# Patient Record
Sex: FEMALE | Race: WHITE | NOT HISPANIC OR LATINO
[De-identification: names, ages, dates, MRNs, and addresses within clinical notes are randomized per-mention and may not be internally consistent; named-entity substitution may affect disease eponyms.]

---

## 2017-01-26 ENCOUNTER — APPOINTMENT (OUTPATIENT)
Dept: OBGYN | Facility: CLINIC | Age: 52
End: 2017-01-26

## 2017-01-26 VITALS
BODY MASS INDEX: 29.82 KG/M2 | WEIGHT: 190 LBS | HEIGHT: 67 IN | DIASTOLIC BLOOD PRESSURE: 76 MMHG | SYSTOLIC BLOOD PRESSURE: 138 MMHG

## 2017-01-26 DIAGNOSIS — B00.2 HERPESVIRAL GINGIVOSTOMATITIS AND PHARYNGOTONSILLITIS: ICD-10-CM

## 2017-01-26 DIAGNOSIS — Z80.3 FAMILY HISTORY OF MALIGNANT NEOPLASM OF BREAST: ICD-10-CM

## 2017-01-26 DIAGNOSIS — Z87.2 PERSONAL HISTORY OF DISEASES OF THE SKIN AND SUBCUTANEOUS TISSUE: ICD-10-CM

## 2017-01-26 LAB
DATE COLLECTED: NORMAL
HEMOCCULT SP1 STL QL: NEGATIVE
QUALITY CONTROL: YES

## 2017-01-26 RX ORDER — VORTIOXETINE 20 MG/1
20 TABLET, FILM COATED ORAL
Qty: 90 | Refills: 0 | Status: DISCONTINUED | COMMUNITY
Start: 2016-08-09 | End: 2017-01-26

## 2017-02-01 ENCOUNTER — RX RENEWAL (OUTPATIENT)
Age: 52
End: 2017-02-01

## 2017-02-01 LAB
CYTOLOGY CVX/VAG DOC THIN PREP: NORMAL
HPV HIGH+LOW RISK DNA PNL CVX: NEGATIVE

## 2017-04-19 ENCOUNTER — RX RENEWAL (OUTPATIENT)
Age: 52
End: 2017-04-19

## 2017-04-19 DIAGNOSIS — R39.9 UNSPECIFIED SYMPTOMS AND SIGNS INVOLVING THE GENITOURINARY SYSTEM: ICD-10-CM

## 2018-02-12 ENCOUNTER — APPOINTMENT (OUTPATIENT)
Dept: OBGYN | Facility: CLINIC | Age: 53
End: 2018-02-12
Payer: COMMERCIAL

## 2018-02-12 VITALS
HEIGHT: 67 IN | BODY MASS INDEX: 29.82 KG/M2 | SYSTOLIC BLOOD PRESSURE: 130 MMHG | WEIGHT: 190 LBS | DIASTOLIC BLOOD PRESSURE: 80 MMHG

## 2018-02-12 DIAGNOSIS — Z86.59 PERSONAL HISTORY OF OTHER MENTAL AND BEHAVIORAL DISORDERS: ICD-10-CM

## 2018-02-12 LAB
DATE COLLECTED: NORMAL
HEMOCCULT SP1 STL QL: NEGATIVE
QUALITY CONTROL: YES

## 2018-02-12 PROCEDURE — 99396 PREV VISIT EST AGE 40-64: CPT

## 2018-02-12 PROCEDURE — 36415 COLL VENOUS BLD VENIPUNCTURE: CPT

## 2018-02-12 PROCEDURE — 99214 OFFICE O/P EST MOD 30 MIN: CPT | Mod: 25

## 2018-02-12 PROCEDURE — 82270 OCCULT BLOOD FECES: CPT

## 2018-02-12 RX ORDER — ACYCLOVIR 800 MG/1
800 TABLET ORAL
Qty: 90 | Refills: 3 | Status: COMPLETED | COMMUNITY
Start: 2017-01-26 | End: 2018-02-12

## 2018-02-12 RX ORDER — ESCITALOPRAM OXALATE 10 MG/1
10 TABLET ORAL
Qty: 30 | Refills: 0 | Status: DISCONTINUED | COMMUNITY
Start: 2017-01-19 | End: 2018-02-12

## 2018-02-16 LAB
CYTOLOGY CVX/VAG DOC THIN PREP: NORMAL
HPV HIGH+LOW RISK DNA PNL CVX: NOT DETECTED

## 2018-03-10 DIAGNOSIS — Z13.71 ENCOUNTER FOR NONPROCREATIVE SCREENING FOR GENETIC DISEASE CARRIER STATUS: ICD-10-CM

## 2018-08-30 ENCOUNTER — APPOINTMENT (OUTPATIENT)
Dept: RHEUMATOLOGY | Facility: CLINIC | Age: 53
End: 2018-08-30
Payer: COMMERCIAL

## 2018-08-30 VITALS
OXYGEN SATURATION: 97 % | HEIGHT: 67 IN | SYSTOLIC BLOOD PRESSURE: 138 MMHG | TEMPERATURE: 98.9 F | DIASTOLIC BLOOD PRESSURE: 82 MMHG | HEART RATE: 73 BPM | BODY MASS INDEX: 30.92 KG/M2 | WEIGHT: 197 LBS

## 2018-08-30 DIAGNOSIS — Z83.79 FAMILY HISTORY OF OTHER DISEASES OF THE DIGESTIVE SYSTEM: ICD-10-CM

## 2018-08-30 PROCEDURE — 99205 OFFICE O/P NEW HI 60 MIN: CPT | Mod: 25

## 2018-08-30 PROCEDURE — 36415 COLL VENOUS BLD VENIPUNCTURE: CPT

## 2018-09-16 PROBLEM — Z83.79 FAMILY HISTORY OF CROHN'S DISEASE: Status: ACTIVE | Noted: 2018-09-16

## 2018-09-16 RX ORDER — ESCITALOPRAM OXALATE 20 MG/1
20 TABLET ORAL
Refills: 0 | Status: COMPLETED | COMMUNITY

## 2018-09-16 RX ORDER — ALPRAZOLAM 0.25 MG/1
0.25 TABLET ORAL
Refills: 0 | Status: COMPLETED | COMMUNITY

## 2018-09-16 RX ORDER — FOLIC ACID 1 MG/1
1 TABLET ORAL
Refills: 0 | Status: COMPLETED | COMMUNITY

## 2018-09-16 RX ORDER — PREDNISONE 10 MG/1
10 TABLET ORAL
Refills: 0 | Status: COMPLETED | COMMUNITY

## 2018-09-16 RX ORDER — LORATADINE 5 MG/5 ML
10 SOLUTION, ORAL ORAL
Refills: 0 | Status: COMPLETED | COMMUNITY

## 2018-09-16 RX ORDER — ACETAMINOPHEN 500 MG
1000 TABLET ORAL
Refills: 0 | Status: COMPLETED | COMMUNITY

## 2018-09-16 RX ORDER — DOCOSAHEXAENOIC ACID 300 MG
50 MCG CAPSULE ORAL
Refills: 0 | Status: COMPLETED | COMMUNITY

## 2018-09-25 ENCOUNTER — APPOINTMENT (OUTPATIENT)
Dept: RHEUMATOLOGY | Facility: CLINIC | Age: 53
End: 2018-09-25
Payer: COMMERCIAL

## 2018-09-25 VITALS
OXYGEN SATURATION: 98 % | SYSTOLIC BLOOD PRESSURE: 130 MMHG | TEMPERATURE: 98.5 F | HEIGHT: 67 IN | BODY MASS INDEX: 30.61 KG/M2 | DIASTOLIC BLOOD PRESSURE: 84 MMHG | WEIGHT: 195 LBS | HEART RATE: 69 BPM

## 2018-09-25 PROCEDURE — 99215 OFFICE O/P EST HI 40 MIN: CPT

## 2018-09-25 RX ORDER — PREDNISONE 2.5 MG/1
2.5 TABLET ORAL DAILY
Refills: 0 | Status: DISCONTINUED | COMMUNITY
End: 2018-09-25

## 2018-10-23 ENCOUNTER — APPOINTMENT (OUTPATIENT)
Dept: RHEUMATOLOGY | Facility: CLINIC | Age: 53
End: 2018-10-23
Payer: COMMERCIAL

## 2018-10-23 VITALS
TEMPERATURE: 98.4 F | BODY MASS INDEX: 30.45 KG/M2 | HEIGHT: 67 IN | OXYGEN SATURATION: 98 % | WEIGHT: 194 LBS | SYSTOLIC BLOOD PRESSURE: 144 MMHG | DIASTOLIC BLOOD PRESSURE: 76 MMHG | HEART RATE: 76 BPM

## 2018-10-23 PROCEDURE — 90686 IIV4 VACC NO PRSV 0.5 ML IM: CPT | Mod: LT

## 2018-10-23 PROCEDURE — G0008: CPT | Mod: LT

## 2018-10-23 PROCEDURE — 96401 CHEMO ANTI-NEOPL SQ/IM: CPT | Mod: RT

## 2018-10-23 PROCEDURE — 36415 COLL VENOUS BLD VENIPUNCTURE: CPT

## 2018-10-23 PROCEDURE — 99215 OFFICE O/P EST HI 40 MIN: CPT | Mod: 25

## 2018-10-24 ENCOUNTER — MED ADMIN CHARGE (OUTPATIENT)
Age: 53
End: 2018-10-24

## 2018-10-24 RX ADMIN — USTEKINUMAB 0 MG/0.5ML: 45 INJECTION, SOLUTION SUBCUTANEOUS at 00:00

## 2018-11-06 RX ORDER — ACYCLOVIR 800 MG/1
800 TABLET ORAL
Qty: 90 | Refills: 3 | Status: DISCONTINUED | COMMUNITY
Start: 2018-02-12 | End: 2018-11-06

## 2018-11-06 RX ORDER — LAMOTRIGINE 50 MG/1
50 TABLET, ORALLY DISINTEGRATING ORAL
Refills: 0 | Status: DISCONTINUED | COMMUNITY
End: 2018-11-06

## 2018-11-06 RX ORDER — ADALIMUMAB 40MG/0.8ML
40 KIT SUBCUTANEOUS
Qty: 3 | Refills: 3 | Status: DISCONTINUED | COMMUNITY
End: 2018-10-23

## 2018-11-12 RX ORDER — USTEKINUMAB 45 MG/.5ML
45 INJECTION, SOLUTION SUBCUTANEOUS
Qty: 0 | Refills: 0 | Status: COMPLETED | OUTPATIENT
Start: 2018-10-24

## 2018-11-27 ENCOUNTER — APPOINTMENT (OUTPATIENT)
Dept: RHEUMATOLOGY | Facility: CLINIC | Age: 53
End: 2018-11-27
Payer: COMMERCIAL

## 2018-11-27 ENCOUNTER — LABORATORY RESULT (OUTPATIENT)
Age: 53
End: 2018-11-27

## 2018-11-27 VITALS
OXYGEN SATURATION: 98 % | DIASTOLIC BLOOD PRESSURE: 82 MMHG | WEIGHT: 192 LBS | HEIGHT: 67 IN | TEMPERATURE: 97.4 F | BODY MASS INDEX: 30.13 KG/M2 | SYSTOLIC BLOOD PRESSURE: 123 MMHG | HEART RATE: 74 BPM

## 2018-11-27 DIAGNOSIS — M15.9 POLYOSTEOARTHRITIS, UNSPECIFIED: ICD-10-CM

## 2018-11-27 PROCEDURE — 96372 THER/PROPH/DIAG INJ SC/IM: CPT | Mod: RT

## 2018-11-27 PROCEDURE — 36415 COLL VENOUS BLD VENIPUNCTURE: CPT

## 2018-11-27 PROCEDURE — 99215 OFFICE O/P EST HI 40 MIN: CPT | Mod: 25

## 2018-11-27 RX ORDER — LAMOTRIGINE 2 MG/1
TABLET, FOR SUSPENSION ORAL
Refills: 0 | Status: DISCONTINUED | COMMUNITY
End: 2018-11-27

## 2018-11-27 RX ORDER — USTEKINUMAB 45 MG/.5ML
45 INJECTION, SOLUTION SUBCUTANEOUS
Qty: 2 | Refills: 0 | Status: DISCONTINUED | COMMUNITY
Start: 2018-10-23 | End: 2018-11-27

## 2018-11-27 RX ORDER — METHYLPRED ACET/NACL,ISO-OS/PF 40 MG/ML
40 VIAL (ML) INJECTION
Qty: 1 | Refills: 0 | Status: COMPLETED | OUTPATIENT
Start: 2018-11-27

## 2018-11-27 RX ADMIN — METHYLPREDNISOLONE ACETATE MG/ML: 40 INJECTION, SUSPENSION INTRA-ARTICULAR; INTRALESIONAL; INTRAMUSCULAR; SOFT TISSUE at 00:00

## 2018-12-27 ENCOUNTER — RX RENEWAL (OUTPATIENT)
Age: 53
End: 2018-12-27

## 2018-12-27 ENCOUNTER — APPOINTMENT (OUTPATIENT)
Dept: RHEUMATOLOGY | Facility: CLINIC | Age: 53
End: 2018-12-27
Payer: COMMERCIAL

## 2018-12-27 VITALS
SYSTOLIC BLOOD PRESSURE: 133 MMHG | HEART RATE: 77 BPM | TEMPERATURE: 98.6 F | WEIGHT: 187 LBS | DIASTOLIC BLOOD PRESSURE: 80 MMHG | HEIGHT: 67 IN | BODY MASS INDEX: 29.35 KG/M2 | OXYGEN SATURATION: 99 %

## 2018-12-27 PROCEDURE — 99215 OFFICE O/P EST HI 40 MIN: CPT | Mod: 25

## 2018-12-27 PROCEDURE — 36415 COLL VENOUS BLD VENIPUNCTURE: CPT

## 2018-12-27 RX ORDER — ONDANSETRON 4 MG/1
4 TABLET, ORALLY DISINTEGRATING ORAL
Qty: 60 | Refills: 0 | Status: DISCONTINUED | COMMUNITY
Start: 2018-11-06 | End: 2018-12-27

## 2019-01-24 LAB
25(OH)D3 SERPL-MCNC: 37.4 NG/ML
ALBUMIN SERPL ELPH-MCNC: 4.2 G/DL
ALBUMIN SERPL ELPH-MCNC: 4.3 G/DL
ALBUMIN SERPL ELPH-MCNC: 4.5 G/DL
ALP BLD-CCNC: 65 U/L
ALP BLD-CCNC: 83 U/L
ALP BLD-CCNC: 85 U/L
ALT SERPL-CCNC: 16 U/L
ALT SERPL-CCNC: 18 U/L
ALT SERPL-CCNC: 21 U/L
ANA PAT FLD IF-IMP: ABNORMAL
ANA SER IF-ACNC: ABNORMAL
ANION GAP SERPL CALC-SCNC: 11 MMOL/L
ANION GAP SERPL CALC-SCNC: 12 MMOL/L
ANION GAP SERPL CALC-SCNC: 14 MMOL/L
AST SERPL-CCNC: 21 U/L
AST SERPL-CCNC: 22 U/L
AST SERPL-CCNC: 24 U/L
BASOPHILS # BLD AUTO: 0.03 K/UL
BASOPHILS NFR BLD AUTO: 0.3 %
BASOPHILS NFR BLD AUTO: 0.5 %
BASOPHILS NFR BLD AUTO: 0.6 %
BILIRUB SERPL-MCNC: 0.2 MG/DL
BILIRUB SERPL-MCNC: 0.2 MG/DL
BILIRUB SERPL-MCNC: 0.3 MG/DL
BUN SERPL-MCNC: 13 MG/DL
BUN SERPL-MCNC: 18 MG/DL
BUN SERPL-MCNC: 22 MG/DL
CALCIUM SERPL-MCNC: 10.3 MG/DL
CALCIUM SERPL-MCNC: 9.6 MG/DL
CALCIUM SERPL-MCNC: 9.8 MG/DL
CHLORIDE SERPL-SCNC: 103 MMOL/L
CHLORIDE SERPL-SCNC: 97 MMOL/L
CHLORIDE SERPL-SCNC: 99 MMOL/L
CHROMATIN AB SERPL-ACNC: 0.2 AL
CO2 SERPL-SCNC: 28 MMOL/L
CO2 SERPL-SCNC: 29 MMOL/L
CO2 SERPL-SCNC: 30 MMOL/L
CREAT SERPL-MCNC: 0.79 MG/DL
CREAT SERPL-MCNC: 0.88 MG/DL
CREAT SERPL-MCNC: 0.99 MG/DL
CRP SERPL-MCNC: 0.16 MG/DL
CRP SERPL-MCNC: 0.23 MG/DL
CRP SERPL-MCNC: <0.1 MG/DL
DSDNA AB SER-ACNC: 15 IU/ML
ENA RNP AB SER IA-ACNC: <0.2 AL
ENA SM AB SER IA-ACNC: <0.2 AL
ENA SS-A AB SER IA-ACNC: <0.2 AL
ENA SS-B AB SER IA-ACNC: <0.2 AL
ENDOMYSIUM IGA SER QL: NEGATIVE
ENDOMYSIUM IGA TITR SER: NORMAL
EOSINOPHIL # BLD AUTO: 0.08 K/UL
EOSINOPHIL # BLD AUTO: 0.12 K/UL
EOSINOPHIL # BLD AUTO: 0.13 K/UL
EOSINOPHIL NFR BLD AUTO: 1.2 %
EOSINOPHIL NFR BLD AUTO: 1.4 %
EOSINOPHIL NFR BLD AUTO: 2.6 %
ERYTHROCYTE [SEDIMENTATION RATE] IN BLOOD BY WESTERGREN METHOD: 13 MM/HR
ERYTHROCYTE [SEDIMENTATION RATE] IN BLOOD BY WESTERGREN METHOD: 22 MM/HR
ERYTHROCYTE [SEDIMENTATION RATE] IN BLOOD BY WESTERGREN METHOD: 27 MM/HR
GLIADIN IGA SER QL: <5 UNITS
GLIADIN IGG SER QL: 11.8 UNITS
GLIADIN PEPTIDE IGA SER-ACNC: NEGATIVE
GLIADIN PEPTIDE IGG SER-ACNC: NEGATIVE
GLUCOSE SERPL-MCNC: 84 MG/DL
GLUCOSE SERPL-MCNC: 85 MG/DL
GLUCOSE SERPL-MCNC: 92 MG/DL
H PYLORI AB SER-ACNC: 6.6 UNITS
H PYLORI IGA SER-ACNC: 27.1 UNITS
HCT VFR BLD CALC: 38.8 %
HCT VFR BLD CALC: 41.4 %
HCT VFR BLD CALC: 44.4 %
HGB BLD-MCNC: 12.5 G/DL
HGB BLD-MCNC: 13.4 G/DL
HGB BLD-MCNC: 13.7 G/DL
HISTONE AB SER QL: 3.8 UNITS
HLA-B27 RELATED AG QL: NORMAL
IL17A SERPL-MCNC: <5 PG/ML
IMM GRANULOCYTES NFR BLD AUTO: 0 %
IMM GRANULOCYTES NFR BLD AUTO: 0.2 %
IMM GRANULOCYTES NFR BLD AUTO: 0.2 %
LACTATE BLDA-MCNC: 2.6 MMOL/L
LDH SERPL-CCNC: 162 U/L
LYMPHOCYTES # BLD AUTO: 1.06 K/UL
LYMPHOCYTES # BLD AUTO: 1.07 K/UL
LYMPHOCYTES # BLD AUTO: 1.26 K/UL
LYMPHOCYTES NFR BLD AUTO: 10.8 %
LYMPHOCYTES NFR BLD AUTO: 18.3 %
LYMPHOCYTES NFR BLD AUTO: 25.1 %
MAN DIFF?: NORMAL
MCHC RBC-ENTMCNC: 30.5 PG
MCHC RBC-ENTMCNC: 30.9 GM/DL
MCHC RBC-ENTMCNC: 31.5 PG
MCHC RBC-ENTMCNC: 31.6 PG
MCHC RBC-ENTMCNC: 32.2 GM/DL
MCHC RBC-ENTMCNC: 32.4 GM/DL
MCV RBC AUTO: 102.1 FL
MCV RBC AUTO: 94.6 FL
MCV RBC AUTO: 97.6 FL
MONOCYTES # BLD AUTO: 0.39 K/UL
MONOCYTES # BLD AUTO: 0.42 K/UL
MONOCYTES # BLD AUTO: 0.51 K/UL
MONOCYTES NFR BLD AUTO: 5.2 %
MONOCYTES NFR BLD AUTO: 7.3 %
MONOCYTES NFR BLD AUTO: 7.8 %
MPO AB + PR3 PNL SER: NORMAL
NEUTROPHILS # BLD AUTO: 3.2 K/UL
NEUTROPHILS # BLD AUTO: 4.19 K/UL
NEUTROPHILS # BLD AUTO: 8.13 K/UL
NEUTROPHILS NFR BLD AUTO: 63.9 %
NEUTROPHILS NFR BLD AUTO: 72.3 %
NEUTROPHILS NFR BLD AUTO: 82.3 %
PLATELET # BLD AUTO: 318 K/UL
PLATELET # BLD AUTO: 361 K/UL
PLATELET # BLD AUTO: 371 K/UL
POTASSIUM SERPL-SCNC: 4 MMOL/L
POTASSIUM SERPL-SCNC: 4.8 MMOL/L
POTASSIUM SERPL-SCNC: 4.9 MMOL/L
PROT SERPL-MCNC: 7 G/DL
PROT SERPL-MCNC: 7.4 G/DL
PROT SERPL-MCNC: 7.5 G/DL
RBC # BLD: 4.1 M/UL
RBC # BLD: 4.24 M/UL
RBC # BLD: 4.35 M/UL
RBC # FLD: 12.5 %
RBC # FLD: 12.9 %
RBC # FLD: 15.4 %
RHEUMATOID FACTOR IDENTRA: NORMAL
RIBOSOMAL P AB SER IA-ACNC: <0.2 AL
SODIUM SERPL-SCNC: 139 MMOL/L
SODIUM SERPL-SCNC: 142 MMOL/L
SODIUM SERPL-SCNC: 142 MMOL/L
THYROGLOB AB SERPL-ACNC: <20 IU/ML
THYROPEROXIDASE AB SERPL IA-ACNC: 14.1 IU/ML
TSH SERPL-ACNC: 3.09 UIU/ML
TSH SERPL-ACNC: 3.64 UIU/ML
TTG IGA SER IA-ACNC: 6.5 UNITS
TTG IGA SER-ACNC: NEGATIVE
TTG IGG SER IA-ACNC: <5 UNITS
TTG IGG SER IA-ACNC: NEGATIVE
WBC # FLD AUTO: 5.01 K/UL
WBC # FLD AUTO: 5.79 K/UL
WBC # FLD AUTO: 9.88 K/UL

## 2019-01-29 ENCOUNTER — APPOINTMENT (OUTPATIENT)
Dept: RHEUMATOLOGY | Facility: CLINIC | Age: 54
End: 2019-01-29
Payer: COMMERCIAL

## 2019-01-29 VITALS
TEMPERATURE: 98.8 F | HEART RATE: 80 BPM | OXYGEN SATURATION: 99 % | DIASTOLIC BLOOD PRESSURE: 80 MMHG | SYSTOLIC BLOOD PRESSURE: 118 MMHG

## 2019-01-29 PROCEDURE — 99214 OFFICE O/P EST MOD 30 MIN: CPT

## 2019-01-29 RX ORDER — NORTRIPTYLINE HYDROCHLORIDE 25 MG/1
25 CAPSULE ORAL
Qty: 30 | Refills: 1 | Status: DISCONTINUED | COMMUNITY
Start: 2018-12-27 | End: 2019-01-29

## 2019-03-06 ENCOUNTER — APPOINTMENT (OUTPATIENT)
Dept: OBGYN | Facility: CLINIC | Age: 54
End: 2019-03-06
Payer: COMMERCIAL

## 2019-03-06 VITALS
HEIGHT: 67 IN | WEIGHT: 176 LBS | BODY MASS INDEX: 27.62 KG/M2 | SYSTOLIC BLOOD PRESSURE: 128 MMHG | DIASTOLIC BLOOD PRESSURE: 80 MMHG

## 2019-03-06 DIAGNOSIS — B00.9 HERPESVIRAL INFECTION, UNSPECIFIED: ICD-10-CM

## 2019-03-06 LAB
DATE COLLECTED: NORMAL
HEMOCCULT SP1 STL QL: NEGATIVE
QUALITY CONTROL: YES

## 2019-03-06 PROCEDURE — 99396 PREV VISIT EST AGE 40-64: CPT

## 2019-03-06 PROCEDURE — 82270 OCCULT BLOOD FECES: CPT

## 2019-03-06 NOTE — PHYSICAL EXAM
[Awake] : awake [Alert] : alert [Soft] : soft [Oriented x3] : oriented to person, place, and time [Normal] : uterus [Labia Majora] : labia major [Labia Minora] : labia minora [No Bleeding] : there was no active vaginal bleeding [Normal Position] : in a normal position [Uterine Adnexae] : were not tender and not enlarged [No Tenderness] : no rectal tenderness [Acute Distress] : no acute distress [LAD] : no lymphadenopathy [Thyroid Nodule] : no thyroid nodule [Goiter] : no goiter [Mass] : no breast mass [Nipple Discharge] : no nipple discharge [Axillary LAD] : no axillary lymphadenopathy [Tender] : non tender [Distended] : not distended [H/Smegaly] : no hepatosplenomegaly [Depressed Mood] : not depressed [Flat Affect] : affect not flat [Tenderness] : nontender [Enlarged ___ wks] : not enlarged [Mass ___ cm] : no uterine mass was palpated [Adnexa Tenderness] : were not tender [Ovarian Mass (___ Cm)] : there were no adnexal masses [Occult Blood] : occult blood test from digital rectal exam was negative

## 2019-03-06 NOTE — REVIEW OF SYSTEMS
[Sleep Disturbances] : sleep disturbances [Anxiety] : anxiety [Nl] : Integumentary [FreeTextEntry1] : fatigue   oligomenorrhea

## 2019-03-15 LAB
C TRACH RRNA SPEC QL NAA+PROBE: NOT DETECTED
CYTOLOGY CVX/VAG DOC THIN PREP: NORMAL
HPV HIGH+LOW RISK DNA PNL CVX: NOT DETECTED
N GONORRHOEA RRNA SPEC QL NAA+PROBE: NOT DETECTED
SOURCE TP AMPLIFICATION: NORMAL

## 2019-03-19 ENCOUNTER — TRANSCRIPTION ENCOUNTER (OUTPATIENT)
Age: 54
End: 2019-03-19

## 2019-04-02 ENCOUNTER — APPOINTMENT (OUTPATIENT)
Dept: RHEUMATOLOGY | Facility: CLINIC | Age: 54
End: 2019-04-02
Payer: COMMERCIAL

## 2019-04-02 VITALS — OXYGEN SATURATION: 98 % | HEART RATE: 67 BPM | RESPIRATION RATE: 18 BRPM

## 2019-04-02 DIAGNOSIS — R63.5 ABNORMAL WEIGHT GAIN: ICD-10-CM

## 2019-04-02 PROCEDURE — 99214 OFFICE O/P EST MOD 30 MIN: CPT

## 2019-04-02 RX ORDER — KETOCONAZOLE 20 MG/G
2 CREAM TOPICAL
Refills: 0 | Status: DISCONTINUED | COMMUNITY
End: 2019-04-02

## 2019-04-05 NOTE — PHYSICAL EXAM
[General Appearance - Alert] : alert [General Appearance - In No Acute Distress] : in no acute distress [Sclera] : the sclera and conjunctiva were normal [PERRL With Normal Accommodation] : pupils were equal in size, round, and reactive to light [Extraocular Movements] : extraocular movements were intact [Outer Ear] : the ears and nose were normal in appearance [Hearing Threshold Finger Rub Not [place]] : hearing was normal [Examination Of The Oral Cavity] : the lips and gums were normal [Nasal Cavity] : the nasal mucosa and septum were normal [Oropharynx] : the oropharynx was normal [Neck Appearance] : the appearance of the neck was normal [Neck Cervical Mass (___cm)] : no neck mass was observed [Jugular Venous Distention Increased] : there was no jugular-venous distention [Thyroid Diffuse Enlargement] : the thyroid was not enlarged [Thyroid Nodule] : there were no palpable thyroid nodules [Respiration, Rhythm And Depth] : normal respiratory rhythm and effort [Auscultation Breath Sounds / Voice Sounds] : lungs were clear to auscultation bilaterally [Heart Rate And Rhythm] : heart rate was normal and rhythm regular [Heart Sounds] : normal S1 and S2 [Heart Sounds Gallop] : no gallops [Murmurs] : no murmurs [Heart Sounds Pericardial Friction Rub] : no pericardial rub [Full Pulse] : the pedal pulses are present [Edema] : there was no peripheral edema [Bowel Sounds] : normal bowel sounds [Abdomen Soft] : soft [Abdomen Tenderness] : non-tender [] : no hepato-splenomegaly [Abdomen Mass (___ Cm)] : no abdominal mass palpated [No CVA Tenderness] : no ~M costovertebral angle tenderness [No Spinal Tenderness] : no spinal tenderness [Abnormal Walk] : normal gait [Nail Clubbing] : no clubbing  or cyanosis of the fingernails [Musculoskeletal - Swelling] : no joint swelling seen [Motor Tone] : muscle strength and tone were normal [Deep Tendon Reflexes (DTR)] : deep tendon reflexes were 2+ and symmetric [Sensation] : the sensory exam was normal to light touch and pinprick [No Focal Deficits] : no focal deficits [Oriented To Time, Place, And Person] : oriented to person, place, and time [Impaired Insight] : insight and judgment were intact [Affect] : the affect was normal [FreeTextEntry1] : \par Hands- OA changes in B/L hands with Heberden and Bel's nodes. \par Wrists- normal\par Elbows- normal\par Shoulders- normal\par Hips- Reduced external rotation bilaterally. \par Knees- Patellofemoral crepitus bilaterally without effusion. \par Ankles- normal\par Feet- normal\par MMT 10/10 proximally/distally bilaterally.

## 2019-04-05 NOTE — HISTORY OF PRESENT ILLNESS
[___ Week(s) Ago] : [unfilled] week(s) ago [FreeTextEntry1] : - still overall not feeling well, severe anxiety. Refuses SSRI/SNRI use but taking Xanax prn, hesitant to take other SSRI/SNRI due to she feels it exacerbates her arthralgia and psoriasis. Discussed w/ dermatology that there have been no studies indicating such\par - psychiatrist - suggested stopping Vyvanse, recommends anti-depressant, however pt defers. Taking Xanax prn, also give clomipramine but reported no help in sleep and woke up clammy, worse anxiety.\par - reports wt loss recently, dietary changes - improved eating. Eats eggs, toast, cereal, tofu, chicken, vegetables. Reports decreased intake/appetite\par - keeping medication log\par - frequent awakenings at nighttime.- reports feeling core heat (not fever), uncertain of causes.\par - seeing Dr. Villasenor, pending FAVIOLA hysteroscopy w/ misoprostol. Sono showing cysts w/ thickened endometrium as well.\par - labs not inflammatory, no evidence inflammatory arthritis or active skin disease on exam at this time.  Mild +histone, no offending labs indicating DLE, neg dsDNA and chromatin abs\par - ROS unchanged\par - stopped taking nortriptyline after 1 week since she reports some gastric/abdominal changes, however not different from her baseline\par - skin w/ mild plaque on L>R elbows, improving on knees. +erythema and induration lessened\par - has not started CBD oil yet\par - has GI follow-up, started pro-biotic [de-identified] : \par \par All other ROS negative except as mentioned in HPI.

## 2019-04-05 NOTE — ASSESSMENT
[FreeTextEntry1] : 53y F with severe FM, anxiety, h/o PsA without activity in recent months\par \par 1. PsA, - inflammatory arthritis, psoriasis.  FH of IBD.  Unable to tolerate Otezla, taking NSAIDs chronically and steroids\par   on Humira  s/p psoriasis loading dose, has been on since 7/2018. Has not had good joint response, moderate psoriatic skin response. Reporting diffuse arthralgia and fatigue.\par   on prednisone 10mg/d chronically 7-9/2018, tapered off\par   Stelara x 1 dose - developed GI side effects, refrained from continued use\par Remains inactive, inflamm markers previously normal - off therapy for a few months\par 2. OA knee - likely more component of degenerative rather than inflammatory disease \par 3. 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  Overall appears her symptoms that may be more due to FM/OA rather than inflammatory process, labs for markers normal and no obvious synovitis on exam.\par 5. GERD, dyspepsia, nausea/vomiting - r/o gastric ulcer/gastritis.  GI symptoms not due to Stelara as not known adverse effect. No evidence of infectious gastroenteritis, etc.  Check H pylori and have GI f/u. C/w ranitidine and prn Reglan now.  EGD w/ gastritis, neg H pylori on bx.  Has pending capsule endoscopy. Symptoms seem most likely c/w GI illness, ?IBS since pt seemingly has more of a chronic pain component due to fibromyalgia\par 6. Chronic pain syndrome , FM - secondary, uncertain how long pt has had this disorder vs active PsA. She has some mild active Psoriasis, and labs mostly noninflammatory over last visits.  Has diffuse total body pain, poor nonrestorative sleep, increasing fatigue, depression/anxiety, ?IBS symptoms recently.  Will recommend treatment with nortriptyline vs duloxetine.  Unable to tolerate any po meds\par \par Diffuse symptoms and arthralgias with normal inflammatory markers appear pain is likely c/w chronic pain syndrome vs fibromyalgia + OA than inflammatory arthritis due PsA.  \par \par - remain off Stelara and steroids\par - GI followup \par - needs DEXA due 3/2019, rx given\par - check for any heavy metal exposure, hormone imbalance due to severe fatigue, dysfunction\par - Flu 2018/2019 10/23/18; PPSV needed next visit.  PCV13 UTD 10/3018\par - remain off nortriptyline or other meds. APAP prn\par \par RTO 8 weeks

## 2019-04-05 NOTE — CONSULT LETTER
[Dear  ___] : Dear  [unfilled], [Courtesy Letter:] : I had the pleasure of seeing your patient, [unfilled], in my office today. [Please see my note below.] : Please see my note below. [Consult Closing:] : Thank you very much for allowing me to participate in the care of this patient.  If you have any questions, please do not hesitate to contact me. [Sincerely,] : Sincerely, [FreeTextEntry3] : Terry Angela II, MD\par \par Attending Rheumatologist\par Division of Rheumatology\par [facility]\par     [facility] &\par     Rheumatology at [place],\par     [facility]\par \par \par [facility] at [facility]\par \par Contact:\par    [phone], fax [phone] ([place] office)\par    [phone], fax [phone] ([place] office)\par

## 2019-04-12 LAB
25(OH)D3 SERPL-MCNC: 45.2 NG/ML
ALBUMIN MFR SERPL ELPH: 60 %
ALBUMIN SERPL ELPH-MCNC: 4.6 G/DL
ALBUMIN SERPL-MCNC: 4.8 G/DL
ALBUMIN/GLOB SERPL: 1.5 RATIO
ALP BLD-CCNC: 90 U/L
ALP BONE SERPL-MCNC: 18 MCG/L
ALPHA1 GLOB MFR SERPL ELPH: 3.8 %
ALPHA1 GLOB SERPL ELPH-MCNC: 0.3 G/DL
ALPHA2 GLOB MFR SERPL ELPH: 9.3 %
ALPHA2 GLOB SERPL ELPH-MCNC: 0.7 G/DL
ALT SERPL-CCNC: 15 U/L
ALUMINUM SERPL-MCNC: 6 UG/L
ANA PAT FLD IF-IMP: ABNORMAL
ANACR T: ABNORMAL
ANION GAP SERPL CALC-SCNC: 13 MMOL/L
AST SERPL-CCNC: 17 U/L
B-GLOBULIN MFR SERPL ELPH: 10.8 %
B-GLOBULIN SERPL ELPH-MCNC: 0.9 G/DL
BASOPHILS # BLD AUTO: 0.07 K/UL
BASOPHILS NFR BLD AUTO: 0.9 %
BILIRUB SERPL-MCNC: 0.3 MG/DL
BUN SERPL-MCNC: 14 MG/DL
CA-I SERPL-SCNC: 1.3 MMOL/L
CALCIUM SERPL-MCNC: 10.5 MG/DL
CHLORIDE SERPL-SCNC: 99 MMOL/L
CO2 SERPL-SCNC: 27 MMOL/L
COPPER SERPL-MCNC: 128 UG/DL
CR BLD-MCNC: 0.7 UG/L
CREAT SERPL-MCNC: 0.86 MG/DL
CRP SERPL-MCNC: 0.13 MG/DL
DEPRECATED KAPPA LC FREE/LAMBDA SER: 1.6 RATIO
DEPRECATED KAPPA LC FREE/LAMBDA SER: 1.63 RATIO
DRUG ABUSE PANEL-9, SERUM: NORMAL
EOSINOPHIL # BLD AUTO: 0.04 K/UL
EOSINOPHIL NFR BLD AUTO: 0.5 %
ERYTHROCYTE [SEDIMENTATION RATE] IN BLOOD BY WESTERGREN METHOD: 52 MM/HR
ESTROGEN SERPL-MCNC: 77 PG/ML
FERRITIN SERPL-MCNC: 120 NG/ML
FOLATE SERPL-MCNC: >20 NG/ML
FOLATE SERPL-MCNC: >20 NG/ML
FSH SERPL-MCNC: 149 IU/L
GAMMA GLOB FLD ELPH-MCNC: 1.3 G/DL
GAMMA GLOB MFR SERPL ELPH: 16.1 %
GGT SERPL-CCNC: 12 U/L
GLUCOSE SERPL-MCNC: 100 MG/DL
HCT VFR BLD CALC: 44.7 %
HGB BLD-MCNC: 14.3 G/DL
HISTONE AB SER QL: 2.9 UNITS
IGA SER QL IEP: 231 MG/DL
IGA SER QL IEP: 232 MG/DL
IGG SER QL IEP: 1195 MG/DL
IGG SER QL IEP: 1249 MG/DL
IGM SER QL IEP: 49 MG/DL
IGM SER QL IEP: 50 MG/DL
IMM GRANULOCYTES NFR BLD AUTO: 0.1 %
INTERPRETATION SERPL IEP-IMP: NORMAL
IRON SATN MFR SERPL: 29 %
IRON SERPL-MCNC: 78 UG/DL
KAPPA LC CSF-MCNC: 1.42 MG/DL
KAPPA LC CSF-MCNC: 1.46 MG/DL
KAPPA LC SERPL-MCNC: 2.31 MG/DL
KAPPA LC SERPL-MCNC: 2.33 MG/DL
LDH SERPL-CCNC: 153 U/L
LH SERPL-ACNC: 51 IU/L
LYMPHOCYTES # BLD AUTO: 1.3 K/UL
LYMPHOCYTES NFR BLD AUTO: 16.3 %
M PROTEIN SPEC IFE-MCNC: NORMAL
MAGNESIUM SERPL-MCNC: 2 MG/DL
MAN DIFF?: NORMAL
MANGANESE BLD-MCNC: 22.1 UG/L
MCHC RBC-ENTMCNC: 30.5 PG
MCHC RBC-ENTMCNC: 32 GM/DL
MCV RBC AUTO: 95.3 FL
MONOCYTES # BLD AUTO: 0.37 K/UL
MONOCYTES NFR BLD AUTO: 4.6 %
NEUTROPHILS # BLD AUTO: 6.2 K/UL
NEUTROPHILS NFR BLD AUTO: 77.6 %
PHOSPHATE SERPL-MCNC: 3.7 MG/DL
PLATELET # BLD AUTO: 378 K/UL
POTASSIUM SERPL-SCNC: 4.7 MMOL/L
PROGEST SERPL-MCNC: 0.2 NG/ML
PROT SERPL-MCNC: 7.9 G/DL
PROT SERPL-MCNC: 8 G/DL
PROT SERPL-MCNC: 8 G/DL
RBC # BLD: 4.69 M/UL
RBC # FLD: 12.8 %
SHBG SERPL-SCNC: 61 NMOL/L
SODIUM SERPL-SCNC: 139 MMOL/L
TESTOST BND SERPL-MCNC: 1 PG/ML
TESTOST SERPL-MCNC: 26.1 NG/DL
TIBC SERPL-MCNC: 268 UG/DL
TOTAL IGE SMQN RAST: 12 KU/L
TSH SERPL-ACNC: 1.95 UIU/ML
TSH SERPL-ACNC: 1.98 UIU/ML
UIBC SERPL-MCNC: 190 UG/DL
VIT B12 SERPL-MCNC: 765 PG/ML
VIT B12 SERPL-MCNC: 769 PG/ML
VIT B6 SERPL-MCNC: 23.8 UG/L
WBC # FLD AUTO: 7.99 K/UL
ZINC SERPL-MCNC: 105 UG/DL

## 2019-04-23 ENCOUNTER — LABORATORY RESULT (OUTPATIENT)
Age: 54
End: 2019-04-23

## 2019-05-24 ENCOUNTER — APPOINTMENT (OUTPATIENT)
Dept: OBGYN | Facility: CLINIC | Age: 54
End: 2019-05-24
Payer: COMMERCIAL

## 2019-05-24 DIAGNOSIS — R11.2 NAUSEA WITH VOMITING, UNSPECIFIED: ICD-10-CM

## 2019-05-24 PROCEDURE — 58558Z: CUSTOM

## 2019-05-29 RX ORDER — CLONIDINE HYDROCHLORIDE 0.1 MG/1
0.1 TABLET ORAL
Refills: 0 | Status: DISCONTINUED | COMMUNITY
End: 2019-05-29

## 2019-05-29 RX ORDER — METOCLOPRAMIDE 10 MG/1
10 TABLET ORAL
Qty: 60 | Refills: 0 | Status: DISCONTINUED | COMMUNITY
Start: 2018-12-20 | End: 2019-05-29

## 2019-05-29 RX ORDER — ALPRAZOLAM 0.25 MG/1
0.25 TABLET ORAL
Qty: 30 | Refills: 0 | Status: ACTIVE | COMMUNITY
Start: 2019-05-29

## 2019-05-29 RX ORDER — MISOPROSTOL 100 UG/1
100 TABLET ORAL DAILY
Qty: 2 | Refills: 0 | Status: DISCONTINUED | COMMUNITY
Start: 2019-03-06 | End: 2019-05-29

## 2019-05-29 RX ORDER — ATORVASTATIN CALCIUM 10 MG/1
10 TABLET, FILM COATED ORAL
Refills: 0 | Status: DISCONTINUED | COMMUNITY
Start: 2019-05-03 | End: 2019-05-29

## 2019-05-30 LAB — CORE LAB BIOPSY: NORMAL

## 2019-06-04 ENCOUNTER — APPOINTMENT (OUTPATIENT)
Dept: RHEUMATOLOGY | Facility: CLINIC | Age: 54
End: 2019-06-04
Payer: COMMERCIAL

## 2019-06-04 VITALS
OXYGEN SATURATION: 100 % | DIASTOLIC BLOOD PRESSURE: 83 MMHG | HEART RATE: 66 BPM | SYSTOLIC BLOOD PRESSURE: 134 MMHG | TEMPERATURE: 98.4 F

## 2019-06-04 DIAGNOSIS — M25.561 PAIN IN RIGHT KNEE: ICD-10-CM

## 2019-06-04 DIAGNOSIS — R11.0 NAUSEA: ICD-10-CM

## 2019-06-04 DIAGNOSIS — G89.29 PAIN IN RIGHT KNEE: ICD-10-CM

## 2019-06-04 DIAGNOSIS — F32.9 MAJOR DEPRESSIVE DISORDER, SINGLE EPISODE, UNSPECIFIED: ICD-10-CM

## 2019-06-04 DIAGNOSIS — M65.232 CALCIFIC TENDINITIS, LEFT FOREARM: ICD-10-CM

## 2019-06-04 PROCEDURE — 99215 OFFICE O/P EST HI 40 MIN: CPT | Mod: 25

## 2019-06-04 PROCEDURE — 36415 COLL VENOUS BLD VENIPUNCTURE: CPT

## 2019-06-04 RX ORDER — SIMVASTATIN 10 MG/1
10 TABLET, FILM COATED ORAL
Refills: 0 | Status: ACTIVE | COMMUNITY

## 2019-06-04 RX ORDER — FOLIC ACID 1 MG/1
1 TABLET ORAL DAILY
Qty: 90 | Refills: 2 | Status: DISCONTINUED | COMMUNITY
Start: 2018-09-25 | End: 2019-06-04

## 2019-06-04 NOTE — HISTORY OF PRESENT ILLNESS
[FreeTextEntry1] : - reviewed all previous labs in detail - only significant ESR elevation much higher. R hip pain worsened, B/L R>L knee pain. MRI R hip previously 6/2018 w/ severe deg changes and effusion ?synovitis 2/2 PsA vs reactive 2/2 degeneration - unable to determine etiology.\par - pt seeing GYN and other providers.  FAVIOLA testing was good as per pt.\par - psoriasis affecting elbows and forearms\par - remains taking escitalopram and Xanax prn. Discussed starting Stelara again, pt hestitant\par - discussed low-dose naltrexone, gave pt information packet - pt to decide for treatment based if ESR/CRP elevated next set of labs\par - reports wt loss recently, dietary changes - improved eating. Eats eggs, toast, cereal, tofu, chicken, vegetables. Reports decreased intake/appetite. Loss of 30 lbs overall\par - keeping medication log\par - frequent awakenings at nighttime.- reports feeling core heat (not fever), uncertain of causes.\par - Also mild +histone due to previous Humira use, no active DIL. no offending labs indicating DLE, neg dsDNA and chromatin abs\par - ROS unchanged\par - has GI follow-up, started pro-biotic [___ Week(s) Ago] : [unfilled] week(s) ago [de-identified] : \par \par All other ROS negative except as mentioned in HPI.

## 2019-06-04 NOTE — ASSESSMENT
[FreeTextEntry1] : 53y F with severe FM, anxiety, h/o PsA without activity in recent months\par \par 1. PsA, - inflammatory arthritis, psoriasis.  FH of IBD.  Unable to tolerate Otezla, taking NSAIDs chronically and steroids\par   on Humira  s/p psoriasis loading dose, has been on since 7/2018. Has not had good joint response, moderate psoriatic skin response. Reporting diffuse arthralgia and fatigue.\par   on prednisone 10mg/d chronically 7-9/2018, tapered off\par   Stelara x 1 dose - developed GI side effects, refrained from continued use\par Remains inactive, inflamm markers previously normal - off therapy for a few months\par 2. OA knee - likely more component of degenerative rather than inflammatory disease \par 3. 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  Overall appears her symptoms that may be more due to FM/OA rather than inflammatory process, labs for markers normal and no obvious synovitis on exam.\par 5. GERD, dyspepsia, nausea/vomiting - r/o gastric ulcer/gastritis.  GI symptoms not due to Stelara as not known adverse effect. No evidence of infectious gastroenteritis, etc.  Check H pylori and have GI f/u. C/w ranitidine and prn Reglan now.  EGD w/ gastritis, neg H pylori on bx.  Has pending capsule endoscopy. Symptoms seem most likely c/w GI illness, ?IBS since pt seemingly has more of a chronic pain component due to fibromyalgia\par 6. Chronic pain syndrome , FM - secondary, uncertain how long pt has had this disorder vs active PsA. She has some mild active Psoriasis, and labs mostly noninflammatory over last visits.  Has diffuse total body pain, poor nonrestorative sleep, increasing fatigue, depression/anxiety, ?IBS symptoms recently.  Will recommend treatment with nortriptyline vs duloxetine.  Unable to tolerate any po meds\par \par If elevated ESR and synovitis on R hip, will again discuss beginning immunosuppression. Pt afraid to do Stelara again 2/2 her GI symptoms, which I feel were totally unrelated..  \par \par \par - remain off Stelara and steroids\par - GI followup \par - consider LDN\par \par HCM: \par - Flu 2018/2019 10/23/18; PPSV needed next visit.  PCV13 UTD 10/3018\par - Bone Health: DEXA UTD 2/2019\par \par RTO 1 week after MRI

## 2019-06-04 NOTE — PHYSICAL EXAM
[General Appearance - Alert] : alert [General Appearance - In No Acute Distress] : in no acute distress [PERRL With Normal Accommodation] : pupils were equal in size, round, and reactive to light [Sclera] : the sclera and conjunctiva were normal [Extraocular Movements] : extraocular movements were intact [Outer Ear] : the ears and nose were normal in appearance [Nasal Cavity] : the nasal mucosa and septum were normal [Hearing Threshold Finger Rub Not [place]] : hearing was normal [Examination Of The Oral Cavity] : the lips and gums were normal [Oropharynx] : the oropharynx was normal [Neck Appearance] : the appearance of the neck was normal [Neck Cervical Mass (___cm)] : no neck mass was observed [Jugular Venous Distention Increased] : there was no jugular-venous distention [Thyroid Diffuse Enlargement] : the thyroid was not enlarged [Thyroid Nodule] : there were no palpable thyroid nodules [Respiration, Rhythm And Depth] : normal respiratory rhythm and effort [Auscultation Breath Sounds / Voice Sounds] : lungs were clear to auscultation bilaterally [Heart Rate And Rhythm] : heart rate was normal and rhythm regular [Heart Sounds] : normal S1 and S2 [Heart Sounds Gallop] : no gallops [Heart Sounds Pericardial Friction Rub] : no pericardial rub [Murmurs] : no murmurs [Full Pulse] : the pedal pulses are present [Edema] : there was no peripheral edema [Bowel Sounds] : normal bowel sounds [Abdomen Tenderness] : non-tender [Abdomen Soft] : soft [] : no hepato-splenomegaly [Abdomen Mass (___ Cm)] : no abdominal mass palpated [No CVA Tenderness] : no ~M costovertebral angle tenderness [No Spinal Tenderness] : no spinal tenderness [Nail Clubbing] : no clubbing  or cyanosis of the fingernails [Abnormal Walk] : normal gait [Musculoskeletal - Swelling] : no joint swelling seen [Motor Tone] : muscle strength and tone were normal [Deep Tendon Reflexes (DTR)] : deep tendon reflexes were 2+ and symmetric [FreeTextEntry1] : \par Hands- OA changes in B/L hands with Heberden and Bel's nodes. \par Wrists- normal\par Elbows- normal\par Shoulders- normal\par Hips- Reduced external rotation bilaterally. \par Knees- Patellofemoral crepitus bilaterally without effusion. \par Ankles- normal\par Feet- normal\par MMT 10/10 proximally/distally bilaterally.  [Sensation] : the sensory exam was normal to light touch and pinprick [No Focal Deficits] : no focal deficits [Oriented To Time, Place, And Person] : oriented to person, place, and time [Impaired Insight] : insight and judgment were intact [Affect] : the affect was normal

## 2019-06-04 NOTE — CONSULT LETTER
[Dear  ___] : Dear  [unfilled], [Courtesy Letter:] : I had the pleasure of seeing your patient, [unfilled], in my office today. [Consult Closing:] : Thank you very much for allowing me to participate in the care of this patient.  If you have any questions, please do not hesitate to contact me. [Please see my note below.] : Please see my note below. [Sincerely,] : Sincerely, [FreeTextEntry3] : Terry Angela II, MD\par \par Attending Rheumatologist\par Division of Rheumatology\par [facility]\par     [facility] &\par     Rheumatology at [place],\par     [facility]\par \par \par [facility] at [facility]\par \par Contact:\par    [phone], fax [phone] ([place] office)\par    [phone], fax [phone] ([place] office)\par

## 2019-06-25 ENCOUNTER — OUTPATIENT (OUTPATIENT)
Dept: OUTPATIENT SERVICES | Facility: HOSPITAL | Age: 54
LOS: 1 days | End: 2019-06-25
Payer: COMMERCIAL

## 2019-06-25 DIAGNOSIS — Z01.818 ENCOUNTER FOR OTHER PREPROCEDURAL EXAMINATION: ICD-10-CM

## 2019-06-25 LAB
ALBUMIN SERPL ELPH-MCNC: 4.5 G/DL — SIGNIFICANT CHANGE UP (ref 3.3–5.2)
ALP SERPL-CCNC: 93 U/L — SIGNIFICANT CHANGE UP (ref 40–120)
ALT FLD-CCNC: 15 U/L — SIGNIFICANT CHANGE UP
ANION GAP SERPL CALC-SCNC: 11 MMOL/L — SIGNIFICANT CHANGE UP (ref 5–17)
AST SERPL-CCNC: 21 U/L — SIGNIFICANT CHANGE UP
BILIRUB SERPL-MCNC: 0.4 MG/DL — SIGNIFICANT CHANGE UP (ref 0.4–2)
BUN SERPL-MCNC: 16 MG/DL — SIGNIFICANT CHANGE UP (ref 8–20)
CALCIUM SERPL-MCNC: 10.3 MG/DL — HIGH (ref 8.6–10.2)
CHLORIDE SERPL-SCNC: 97 MMOL/L — LOW (ref 98–107)
CO2 SERPL-SCNC: 30 MMOL/L — HIGH (ref 22–29)
CREAT SERPL-MCNC: 0.8 MG/DL — SIGNIFICANT CHANGE UP (ref 0.5–1.3)
GLUCOSE SERPL-MCNC: 94 MG/DL — SIGNIFICANT CHANGE UP (ref 70–115)
HCT VFR BLD CALC: 42.5 % — SIGNIFICANT CHANGE UP (ref 34.5–45)
HGB BLD-MCNC: 13.8 G/DL — SIGNIFICANT CHANGE UP (ref 11.5–15.5)
MCHC RBC-ENTMCNC: 30.5 PG — SIGNIFICANT CHANGE UP (ref 27–34)
MCHC RBC-ENTMCNC: 32.5 GM/DL — SIGNIFICANT CHANGE UP (ref 32–36)
MCV RBC AUTO: 94 FL — SIGNIFICANT CHANGE UP (ref 80–100)
PLATELET # BLD AUTO: 316 K/UL — SIGNIFICANT CHANGE UP (ref 150–400)
POTASSIUM SERPL-MCNC: 4.4 MMOL/L — SIGNIFICANT CHANGE UP (ref 3.5–5.3)
POTASSIUM SERPL-SCNC: 4.4 MMOL/L — SIGNIFICANT CHANGE UP (ref 3.5–5.3)
PROT SERPL-MCNC: 7.8 G/DL — SIGNIFICANT CHANGE UP (ref 6.6–8.7)
RBC # BLD: 4.52 M/UL — SIGNIFICANT CHANGE UP (ref 3.8–5.2)
RBC # FLD: 12.5 % — SIGNIFICANT CHANGE UP (ref 10.3–14.5)
SODIUM SERPL-SCNC: 138 MMOL/L — SIGNIFICANT CHANGE UP (ref 135–145)
WBC # BLD: 6.75 K/UL — SIGNIFICANT CHANGE UP (ref 3.8–10.5)
WBC # FLD AUTO: 6.75 K/UL — SIGNIFICANT CHANGE UP (ref 3.8–10.5)

## 2019-06-25 PROCEDURE — 36415 COLL VENOUS BLD VENIPUNCTURE: CPT

## 2019-06-25 PROCEDURE — 93005 ELECTROCARDIOGRAM TRACING: CPT

## 2019-06-25 PROCEDURE — 85027 COMPLETE CBC AUTOMATED: CPT

## 2019-06-25 PROCEDURE — 80053 COMPREHEN METABOLIC PANEL: CPT

## 2019-06-25 PROCEDURE — G0463: CPT

## 2019-06-25 PROCEDURE — 93010 ELECTROCARDIOGRAM REPORT: CPT

## 2019-06-28 ENCOUNTER — APPOINTMENT (OUTPATIENT)
Dept: RHEUMATOLOGY | Facility: CLINIC | Age: 54
End: 2019-06-28
Payer: COMMERCIAL

## 2019-06-28 VITALS
TEMPERATURE: 98.5 F | BODY MASS INDEX: 25.43 KG/M2 | HEART RATE: 74 BPM | RESPIRATION RATE: 16 BRPM | HEIGHT: 67 IN | OXYGEN SATURATION: 98 % | DIASTOLIC BLOOD PRESSURE: 71 MMHG | SYSTOLIC BLOOD PRESSURE: 108 MMHG | WEIGHT: 162 LBS

## 2019-06-28 DIAGNOSIS — R10.13 EPIGASTRIC PAIN: ICD-10-CM

## 2019-06-28 PROCEDURE — 99215 OFFICE O/P EST HI 40 MIN: CPT

## 2019-07-01 PROBLEM — R10.13 DYSPEPSIA: Status: ACTIVE | Noted: 2018-11-09

## 2019-07-05 ENCOUNTER — RESULT REVIEW (OUTPATIENT)
Age: 54
End: 2019-07-05

## 2019-07-17 ENCOUNTER — APPOINTMENT (OUTPATIENT)
Dept: OBGYN | Facility: CLINIC | Age: 54
End: 2019-07-17
Payer: COMMERCIAL

## 2019-07-17 PROCEDURE — 99214 OFFICE O/P EST MOD 30 MIN: CPT

## 2019-07-19 ENCOUNTER — APPOINTMENT (OUTPATIENT)
Dept: GYNECOLOGIC ONCOLOGY | Facility: CLINIC | Age: 54
End: 2019-07-19
Payer: COMMERCIAL

## 2019-07-19 VITALS — WEIGHT: 162 LBS | HEIGHT: 67 IN | BODY MASS INDEX: 25.43 KG/M2

## 2019-07-19 DIAGNOSIS — Z80.1 FAMILY HISTORY OF MALIGNANT NEOPLASM OF TRACHEA, BRONCHUS AND LUNG: ICD-10-CM

## 2019-07-19 DIAGNOSIS — Z80.49 FAMILY HISTORY OF MALIGNANT NEOPLASM OF OTHER GENITAL ORGANS: ICD-10-CM

## 2019-07-19 DIAGNOSIS — E78.5 HYPERLIPIDEMIA, UNSPECIFIED: ICD-10-CM

## 2019-07-19 DIAGNOSIS — R91.1 SOLITARY PULMONARY NODULE: ICD-10-CM

## 2019-07-19 PROCEDURE — 76830 TRANSVAGINAL US NON-OB: CPT | Mod: 59

## 2019-07-19 PROCEDURE — 99245 OFF/OP CONSLTJ NEW/EST HI 55: CPT | Mod: 25

## 2019-07-19 PROCEDURE — 76857 US EXAM PELVIC LIMITED: CPT | Mod: 59

## 2019-07-22 NOTE — CHIEF COMPLAINT
[FreeTextEntry1] : [place] Office\par \par [facility] [phone] at [address] [place] [address]

## 2019-07-22 NOTE — PHYSICAL EXAM
[Normal] : Bimanual Exam: Normal [de-identified] : Patient was interviewed and examined with chaperone present. Name of Chaperone: Kavya Moser PA-C

## 2019-07-22 NOTE — END OF VISIT
[FreeTextEntry3] : This note accurately reflects the work and decisions made by me. \par Written by Kavya Moser PA-C acting as a scribe for Dr. Nino Mishra.

## 2019-07-22 NOTE — ASSESSMENT
[FreeTextEntry1] : I discussed at length with the patient the nature, purpose, risks, benefits, and alternatives of Robot assisted total laparoscopic hysterectomy with bilateral salpingo-oophorectomy, and [place] lymph node mapping and staging (including possible omentectomy).  The patient understands the risks to include,but not be limited to, bowel injury, bleeding (with the possible need for transfusion), bladder or ureteral injury, infections, deep venous thrombosis, and marissa-operative death.  The patient understands the utility of intraoperative frozen section to determine whether surgical staging will be performed. The patient also understands that her surgery may not be able to be performed robotically and that she may need a laparotomy.  She also understands the limitations of robotic surgery and the possibility of missing a surgical complication with need for subsequent re-exploration.  She agrees to proceed.  She asked numerous questions which were answered to her satisfaction.  She understands the need for a pre-operative bowel preparation and agrees to comply with our instructions.  She also understands the rationale for a cystoscopy at the completion of the procedure and the potential risks of cystoscopy.  The patient also understands the possible limitations of frozen section and the limitations of robotic staging compared to a laparotomy approach.\par

## 2019-07-22 NOTE — HISTORY OF PRESENT ILLNESS
[FreeTextEntry1] : This 55 y/o  female, LMP 3-4 years ago being referred by Dr. Allen for abnormal D&C pathology for endometrial polyp. 19 EMC revealed endometrial complex hyperplasia with atypia, with mucinous features, involving endometrial polyps. Denies changes in bladder and bowel habits, and pelvic pains as well as post menopausal bleeding. \par \par Of note pt. has a IVIS lung nodule that has been observed for the past few years, she is going for IR needle biopsy at [place] next week for further evaluation. \par \par PAP - 3/2019; normal as per patient \par Mammogram - 2019; normal as per patient \par Colonoscopy - 2019; normal as per patient \par Bone density - UTD

## 2019-08-26 ENCOUNTER — APPOINTMENT (OUTPATIENT)
Dept: GYNECOLOGIC ONCOLOGY | Facility: CLINIC | Age: 54
End: 2019-08-26

## 2019-09-06 ENCOUNTER — APPOINTMENT (OUTPATIENT)
Dept: GYNECOLOGIC ONCOLOGY | Facility: CLINIC | Age: 54
End: 2019-09-06

## 2019-09-27 ENCOUNTER — APPOINTMENT (OUTPATIENT)
Dept: RHEUMATOLOGY | Facility: CLINIC | Age: 54
End: 2019-09-27
Payer: COMMERCIAL

## 2019-09-27 ENCOUNTER — APPOINTMENT (OUTPATIENT)
Dept: GYNECOLOGIC ONCOLOGY | Facility: CLINIC | Age: 54
End: 2019-09-27
Payer: COMMERCIAL

## 2019-09-27 VITALS
SYSTOLIC BLOOD PRESSURE: 126 MMHG | HEART RATE: 65 BPM | OXYGEN SATURATION: 99 % | BODY MASS INDEX: 25.71 KG/M2 | HEIGHT: 66 IN | TEMPERATURE: 98.5 F | DIASTOLIC BLOOD PRESSURE: 83 MMHG | WEIGHT: 160 LBS

## 2019-09-27 PROCEDURE — 76830 TRANSVAGINAL US NON-OB: CPT | Mod: 59

## 2019-09-27 PROCEDURE — 76857 US EXAM PELVIC LIMITED: CPT | Mod: 59

## 2019-09-27 PROCEDURE — 99215 OFFICE O/P EST HI 40 MIN: CPT

## 2019-09-27 PROCEDURE — 99214 OFFICE O/P EST MOD 30 MIN: CPT | Mod: 25

## 2019-09-27 RX ORDER — UBIDECARENONE/VIT E ACET 100MG-5
50 MCG CAPSULE ORAL
Refills: 0 | Status: DISCONTINUED | COMMUNITY
Start: 2019-05-29 | End: 2019-09-27

## 2019-09-27 RX ORDER — MULTIVIT-MIN/IRON FUM/FOLIC AC 7.5 MG-4
TABLET ORAL
Refills: 0 | Status: DISCONTINUED | COMMUNITY
Start: 2019-05-29 | End: 2019-09-27

## 2019-09-27 RX ORDER — APREPITANT 40 MG/1
40 CAPSULE ORAL DAILY
Qty: 4 | Refills: 0 | Status: DISCONTINUED | COMMUNITY
Start: 2019-06-07 | End: 2019-09-27

## 2019-09-27 RX ORDER — BACILLUS COAGULANS/INULIN 1B-250 MG
CAPSULE ORAL
Refills: 0 | Status: DISCONTINUED | COMMUNITY
End: 2019-09-27

## 2019-09-27 RX ORDER — DICLOFENAC SODIUM 10 MG/G
1 GEL TOPICAL TWICE DAILY
Qty: 1 | Refills: 0 | Status: DISCONTINUED | COMMUNITY
Start: 2019-09-27 | End: 2019-09-27

## 2019-10-01 ENCOUNTER — RX CHANGE (OUTPATIENT)
Age: 54
End: 2019-10-01

## 2019-10-16 NOTE — HISTORY OF PRESENT ILLNESS
[FreeTextEntry1] : 55 y/o referred by Dr. Allen for abnormal D&C pathology revealing endometrial complex hyperplasia with atypia, with mucinous features, involving endometrial polyps. We planned to schedule her for a RA TLH BSO, SLNM possible staging, which was postponed due to a IVIS nodule. She is s/p flexible bronchoscopy, left upper lobe wedge resection, apicoposterior segmentectomy, mediastinal lymph node dissection on 8/27/19. Patient tumor positive for EGFR mutation. Pre-op pet/ct revealed FDG activity in the left upper lobe consistent with malignancy, without evidence of metastatic disease. Final pathology was positive for an adenocarcinoma, moderately differentiated. Per patient surgery was curative and does not require further intervention. She denies vaginal bleeding.

## 2019-10-16 NOTE — END OF VISIT
[FreeTextEntry3] : Written by Kristin Guzman, acting as a scribe for Dr. Nino Mishra.\par This note accurately reflects the work and decisions made by me\par

## 2019-10-16 NOTE — REASON FOR VISIT
[FreeTextEntry1] : [facility]\par \par [facility] [phone] at [address] [place] [phone]\par

## 2019-10-16 NOTE — ASSESSMENT
[FreeTextEntry1] : Ultrasound today was normal \par \par I discussed at length with the patient the nature, purpose, risks, benefits, and alternatives of Robot assisted total laparoscopic hysterectomy with bilateral salpingo-oophorectomy.  The patient understands the risks to include (but not be limited to) bowel injury, bleeding (with the possible need for transfusion), bladder or ureteral injury, infections, deep venous thrombosis, and marissa-operative death.  The patient also understands that her surgery may not be able to be performed robotically and that she may need a laparotomy.  She also understands the limitations of robotic surgery and the possibility of missing a surgical complication with need for subsequent re-exploration.  She agrees to proceed.  She asked numerous questions which were answered to her satisfaction.  She understands the need for a pre-operative bowel preparation and agrees to comply with our instructions.  She also understands the rationale for a cystoscopy at the completion of the procedure and the potential risks of cystoscopy.\par \par I discussed with the patient the 2015 SGO Statement on SNL Mapping:  The widespread availability of the robotic surgery platform and near-infrared imaging with indocyanine green (ICG) has furthered the investigation of SLN mapping.  The role of SLN mapping is currently being evaluated prospectively in several centers. No prospective phase III trials have reported comparisons of SLN mapping to traditional lymphadenectomy in endometrial cancer.  The reported detection rate of SLNs varies greatly across retrospective studies from 70-95% (any detection), approximately 50-60% for bilateral detection with blue dyes, and up to 79% with ICG.  Decisions regarding the performance of para-aortic lymphadenectomy should continue to be determined by the tumor histology, intraoperative analysis of the primary tumor, and status of pelvic lymph nodes at surgery. SLN mapping with adherence to a surgical algorithm and pathologic ultrastaging is a reasonable staging strategy that provides information on bea metastasis and potentially reduces morbidity in patients with apparent uterine confined endometrial cancer. The patient was counseled that the degree of reduction in morbidity associated with SLN sampling, the false negative rate of SLN mapping, and the clinical significance of low volume metastasis with respect to disease recurrence and decisions regarding adjuvant therapy are still under investigation.\par \par

## 2019-10-16 NOTE — PHYSICAL EXAM
[Normal] : No focal neurologic defects observed [de-identified] : Kristin Guzman MA was present the entire time of results and discussion

## 2019-10-22 ENCOUNTER — OUTPATIENT (OUTPATIENT)
Dept: OUTPATIENT SERVICES | Facility: HOSPITAL | Age: 54
LOS: 1 days | End: 2019-10-22
Payer: COMMERCIAL

## 2019-10-22 VITALS
RESPIRATION RATE: 16 BRPM | WEIGHT: 160.72 LBS | HEART RATE: 61 BPM | SYSTOLIC BLOOD PRESSURE: 134 MMHG | TEMPERATURE: 97 F | DIASTOLIC BLOOD PRESSURE: 80 MMHG | HEIGHT: 66 IN

## 2019-10-22 DIAGNOSIS — Z98.890 OTHER SPECIFIED POSTPROCEDURAL STATES: Chronic | ICD-10-CM

## 2019-10-22 DIAGNOSIS — Z29.9 ENCOUNTER FOR PROPHYLACTIC MEASURES, UNSPECIFIED: ICD-10-CM

## 2019-10-22 DIAGNOSIS — Z01.818 ENCOUNTER FOR OTHER PREPROCEDURAL EXAMINATION: ICD-10-CM

## 2019-10-22 DIAGNOSIS — N85.02 ENDOMETRIAL INTRAEPITHELIAL NEOPLASIA [EIN]: ICD-10-CM

## 2019-10-22 DIAGNOSIS — F41.9 ANXIETY DISORDER, UNSPECIFIED: ICD-10-CM

## 2019-10-22 LAB
ALBUMIN SERPL ELPH-MCNC: 4.5 G/DL — SIGNIFICANT CHANGE UP (ref 3.3–5.2)
ALP SERPL-CCNC: 101 U/L — SIGNIFICANT CHANGE UP (ref 40–120)
ALT FLD-CCNC: 23 U/L — SIGNIFICANT CHANGE UP
ANION GAP SERPL CALC-SCNC: 12 MMOL/L — SIGNIFICANT CHANGE UP (ref 5–17)
APTT BLD: 30.9 SEC — SIGNIFICANT CHANGE UP (ref 27.5–36.3)
AST SERPL-CCNC: 25 U/L — SIGNIFICANT CHANGE UP
BASOPHILS # BLD AUTO: 0.06 K/UL — SIGNIFICANT CHANGE UP (ref 0–0.2)
BASOPHILS NFR BLD AUTO: 0.9 % — SIGNIFICANT CHANGE UP (ref 0–2)
BILIRUB SERPL-MCNC: 0.3 MG/DL — LOW (ref 0.4–2)
BLD GP AB SCN SERPL QL: SIGNIFICANT CHANGE UP
BUN SERPL-MCNC: 22 MG/DL — HIGH (ref 8–20)
CALCIUM SERPL-MCNC: 9.6 MG/DL — SIGNIFICANT CHANGE UP (ref 8.6–10.2)
CHLORIDE SERPL-SCNC: 98 MMOL/L — SIGNIFICANT CHANGE UP (ref 98–107)
CO2 SERPL-SCNC: 28 MMOL/L — SIGNIFICANT CHANGE UP (ref 22–29)
CREAT SERPL-MCNC: 0.89 MG/DL — SIGNIFICANT CHANGE UP (ref 0.5–1.3)
EOSINOPHIL # BLD AUTO: 0.13 K/UL — SIGNIFICANT CHANGE UP (ref 0–0.5)
EOSINOPHIL NFR BLD AUTO: 1.9 % — SIGNIFICANT CHANGE UP (ref 0–6)
GLUCOSE SERPL-MCNC: 88 MG/DL — SIGNIFICANT CHANGE UP (ref 70–115)
HBA1C BLD-MCNC: 5.5 % — SIGNIFICANT CHANGE UP (ref 4–5.6)
HCT VFR BLD CALC: 44.1 % — SIGNIFICANT CHANGE UP (ref 34.5–45)
HGB BLD-MCNC: 13.8 G/DL — SIGNIFICANT CHANGE UP (ref 11.5–15.5)
IMM GRANULOCYTES NFR BLD AUTO: 0.3 % — SIGNIFICANT CHANGE UP (ref 0–1.5)
INR BLD: 1.02 RATIO — SIGNIFICANT CHANGE UP (ref 0.88–1.16)
LYMPHOCYTES # BLD AUTO: 1.12 K/UL — SIGNIFICANT CHANGE UP (ref 1–3.3)
LYMPHOCYTES # BLD AUTO: 16.5 % — SIGNIFICANT CHANGE UP (ref 13–44)
MCHC RBC-ENTMCNC: 29.9 PG — SIGNIFICANT CHANGE UP (ref 27–34)
MCHC RBC-ENTMCNC: 31.3 GM/DL — LOW (ref 32–36)
MCV RBC AUTO: 95.7 FL — SIGNIFICANT CHANGE UP (ref 80–100)
MONOCYTES # BLD AUTO: 0.45 K/UL — SIGNIFICANT CHANGE UP (ref 0–0.9)
MONOCYTES NFR BLD AUTO: 6.6 % — SIGNIFICANT CHANGE UP (ref 2–14)
NEUTROPHILS # BLD AUTO: 5.01 K/UL — SIGNIFICANT CHANGE UP (ref 1.8–7.4)
NEUTROPHILS NFR BLD AUTO: 73.8 % — SIGNIFICANT CHANGE UP (ref 43–77)
PLATELET # BLD AUTO: 326 K/UL — SIGNIFICANT CHANGE UP (ref 150–400)
POTASSIUM SERPL-MCNC: 4.5 MMOL/L — SIGNIFICANT CHANGE UP (ref 3.5–5.3)
POTASSIUM SERPL-SCNC: 4.5 MMOL/L — SIGNIFICANT CHANGE UP (ref 3.5–5.3)
PROT SERPL-MCNC: 7.6 G/DL — SIGNIFICANT CHANGE UP (ref 6.6–8.7)
PROTHROM AB SERPL-ACNC: 11.7 SEC — SIGNIFICANT CHANGE UP (ref 10–12.9)
RBC # BLD: 4.61 M/UL — SIGNIFICANT CHANGE UP (ref 3.8–5.2)
RBC # FLD: 13.2 % — SIGNIFICANT CHANGE UP (ref 10.3–14.5)
SODIUM SERPL-SCNC: 138 MMOL/L — SIGNIFICANT CHANGE UP (ref 135–145)
WBC # BLD: 6.79 K/UL — SIGNIFICANT CHANGE UP (ref 3.8–10.5)
WBC # FLD AUTO: 6.79 K/UL — SIGNIFICANT CHANGE UP (ref 3.8–10.5)

## 2019-10-22 PROCEDURE — G0463: CPT

## 2019-10-22 PROCEDURE — 71046 X-RAY EXAM CHEST 2 VIEWS: CPT

## 2019-10-22 PROCEDURE — 71046 X-RAY EXAM CHEST 2 VIEWS: CPT | Mod: 26

## 2019-10-22 PROCEDURE — 93010 ELECTROCARDIOGRAM REPORT: CPT

## 2019-10-22 PROCEDURE — 93005 ELECTROCARDIOGRAM TRACING: CPT

## 2019-10-22 RX ORDER — SODIUM CHLORIDE 9 MG/ML
3 INJECTION INTRAMUSCULAR; INTRAVENOUS; SUBCUTANEOUS EVERY 8 HOURS
Refills: 0 | Status: DISCONTINUED | OUTPATIENT
Start: 2019-11-12 | End: 2019-11-13

## 2019-10-22 NOTE — H&P PST ADULT - NSICDXPASTSURGICALHX_GEN_ALL_CORE_FT
PAST SURGICAL HISTORY:  H/O arthroscopy of knee right 2013    History of D&C 2010, 2019    History of lung surgery wedge restion

## 2019-10-22 NOTE — H&P PST ADULT - HISTORY OF PRESENT ILLNESS
54 year old female who states that she had a vaginal sonogram in July which showed some polyps, she had a D& C done in July, the biopsy showed some atypical cells/ premalignant cells, now scheduled for a total Robotic assisted laparoscopic hysterectomy. she had a lung surgery in August 2019 to remove a malignant lung nodule from left lung.

## 2019-10-22 NOTE — PATIENT PROFILE ADULT - NSPROEDALEARNPREF_GEN_A_NUR
individual instruction/Pre op teaching surgical scrub pain management instructions given to pt by NP/verbal instruction/written material

## 2019-10-22 NOTE — H&P PST ADULT - NSICDXPROBLEM_GEN_ALL_CORE_FT
PROBLEM DIAGNOSES  Problem: Need for prophylactic measure  Assessment and Plan: Caprini Score 6 High risk,  Surgical team should assess /Strongly recommend pharmacological and mechanical measures for VTE prophylaxis     Problem: Anxiety and depression  Assessment and Plan: continue medications as instructed.     Problem: Endometrial intraepithelial neoplasia (EIN)  Assessment and Plan: Robotic assisted total laparoscopic hysterectomy bilateral salpingo oophorectomy sentinel lyphnode mapping, cystoscopy. Medical and pulmonary Clearance pending

## 2019-10-22 NOTE — H&P PST ADULT - ASSESSMENT
medications reviewed, instructions given on what medications to take and what not to take.  CAPRINI VTE 2.0 SCORE [CLOT updated 2019]    AGE RELATED RISK FACTORS                                                       MOBILITY RELATED FACTORS  [ x] Age 41-60 years                                            (1 Point)                    [ ] Bed rest                                                        (1 Point)  [ ] Age: 61-74 years                                           (2 Points)                  [ ] Plaster cast                                                   (2 Points)  [ ] Age= 75 years                                              (3 Points)                    [ ] Bed bound for more than 72 hours                 (2 Points)    DISEASE RELATED RISK FACTORS                                               GENDER SPECIFIC FACTORS  [ ] Edema in the lower extremities                       (1 Point)              [ ] Pregnancy                                                     (1 Point)  [ ] Varicose veins                                               (1 Point)                     [ ] Post-partum < 6 weeks                                   (1 Point)             [x ] BMI > 25 Kg/m2                                            (1 Point)                     [ ] Hormonal therapy  or oral contraception          (1 Point)                 [ ] Sepsis (in the previous month)                        (1 Point)               [ ] History of pregnancy complications                 (1 point)  [ ] Pneumonia or serious lung disease                                               [ ] Unexplained or recurrent                     (1 Point)           (in the previous month)                               (1 Point)  [ ] Abnormal pulmonary function test                     (1 Point)                 SURGERY RELATED RISK FACTORS  [ ] Acute myocardial infarction                              (1 Point)               [ ]  Section                                             (1 Point)  [ ] Congestive heart failure (in the previous month)  (1 Point)      [ ] Minor surgery                                                  (1 Point)   [ ] Inflammatory bowel disease                             (1 Point)               [ ] Arthroscopic surgery                                        (2 Points)  [ ] Central venous access                                      (2 Points)                [ x] General surgery lasting more than 45 minutes (2 points)  [x ] Malignancy- Present or previous                   (2 Points)                [ ] Elective arthroplasty                                         (5 points)    [ ] Stroke (in the previous month)                          (5 Points)                                                                                                                                                           HEMATOLOGY RELATED FACTORS                                                 TRAUMA RELATED RISK FACTORS  [ ] Prior episodes of VTE                                     (3 Points)                [ ] Fracture of the hip, pelvis, or leg                       (5 Points)  [ ] Positive family history for VTE                         (3 Points)             [ ] Acute spinal cord injury (in the previous month)  (5 Points)  [ ] Prothrombin 17129 A                                     (3 Points)               [ ] Paralysis  (less than 1 month)                             (5 Points)  [ ] Factor V Leiden                                             (3 Points)                  [ ] Multiple Trauma within 1 month                        (5 Points)  [ ] Lupus anticoagulants                                     (3 Points)                                                           [ ] Anticardiolipin antibodies                               (3 Points)                                                       [ ] High homocysteine in the blood                      (3 Points)                                             [ ] Other congenital or acquired thrombophilia      (3 Points)                                                [ ] Heparin induced thrombocytopenia                  (3 Points)                                     Total Score [        6  ]  OPIOID RISK TOOL    ASHLEY EACH BOX THAT APPLIES AND ADD TOTALS AT THE END    FAMILY HISTORY OF SUBSTANCE ABUSE                 FEMALE         MALE                                                Alcohol                             [  ]1 pt          [  ]3pts                                               Illegal Drugs                     [  ]2 pts        [  ]3pts                                               Rx Drugs                           [  ]4 pts        [  ]4 pts    PERSONAL HISTORY OF SUBSTANCE ABUSE                                                                                          Alcohol                             [  ]3 pts       [  ]3 pts                                               Illegal Drugs                     [  ]4 pts        [  ]4 pts                                               Rx Drugs                           [  ]5 pts        [  ]5 pts    AGE BETWEEN 16-45 YEARS                                      [  ]1 pt         [  ]1 pt    HISTORY OF PREADOLESCENT   SEXUAL ABUSE                                                             [  ]3 pts        [  ]0pts    PSYCHOLOGICAL DISEASE                     ADD, OCD, Bipolar, Schizophrenia        [  ]2 pts         [  ]2 pts                      Depression                                               [x  ]1 pt           [  ]1 pt           SCORING TOTAL   (add numbers and type here)              ( 1 )                                     A score of 3 or lower indicated LOW risk for future opioid abuse  A score of 4 to 7 indicated moderate risk for future opioid abuse  A score of 8 or higher indicates a high risk for opioid abuse

## 2019-10-22 NOTE — H&P PST ADULT - NSICDXFAMILYHX_GEN_ALL_CORE_FT
FAMILY HISTORY:  Family history of autism  FH: Crohn's disease  FH: lung cancer  FH: rheumatoid arthritis  FH: thyroid disease

## 2019-10-22 NOTE — H&P PST ADULT - NSICDXPASTMEDICALHX_GEN_ALL_CORE_FT
PAST MEDICAL HISTORY:  Anxiety and depression     High cholesterol     Osteoarthritis     Psoriasis     Psoriatic arthritis

## 2019-11-05 RX ORDER — APREPITANT 40 MG/1
40 CAPSULE ORAL
Qty: 1 | Refills: 1 | Status: DISCONTINUED | COMMUNITY
Start: 2019-05-24 | End: 2019-11-05

## 2019-11-05 RX ORDER — CALCIPOTRIENE 50 UG/G
0.01 CREAM TOPICAL TWICE DAILY
Refills: 0 | Status: DISCONTINUED | COMMUNITY
Start: 2019-05-03 | End: 2019-11-05

## 2019-11-05 RX ORDER — KETOCONAZOLE 20 MG/G
2 CREAM TOPICAL
Refills: 0 | Status: DISCONTINUED | COMMUNITY
End: 2019-11-05

## 2019-11-11 ENCOUNTER — FORM ENCOUNTER (OUTPATIENT)
Age: 54
End: 2019-11-11

## 2019-11-11 ENCOUNTER — RESULT REVIEW (OUTPATIENT)
Age: 54
End: 2019-11-11

## 2019-11-12 ENCOUNTER — TRANSCRIPTION ENCOUNTER (OUTPATIENT)
Age: 54
End: 2019-11-12

## 2019-11-12 ENCOUNTER — RESULT REVIEW (OUTPATIENT)
Age: 54
End: 2019-11-12

## 2019-11-12 ENCOUNTER — INPATIENT (INPATIENT)
Facility: HOSPITAL | Age: 54
LOS: 0 days | Discharge: HOME | DRG: 741 | End: 2019-11-13
Attending: OBSTETRICS & GYNECOLOGY | Admitting: OBSTETRICS & GYNECOLOGY
Payer: COMMERCIAL

## 2019-11-12 VITALS
DIASTOLIC BLOOD PRESSURE: 76 MMHG | WEIGHT: 160.72 LBS | RESPIRATION RATE: 16 BRPM | TEMPERATURE: 98 F | HEART RATE: 58 BPM | OXYGEN SATURATION: 99 % | SYSTOLIC BLOOD PRESSURE: 142 MMHG | HEIGHT: 66 IN

## 2019-11-12 DIAGNOSIS — Z98.890 OTHER SPECIFIED POSTPROCEDURAL STATES: Chronic | ICD-10-CM

## 2019-11-12 DIAGNOSIS — N85.02 ENDOMETRIAL INTRAEPITHELIAL NEOPLASIA [EIN]: ICD-10-CM

## 2019-11-12 LAB
ABO RH CONFIRMATION: SIGNIFICANT CHANGE UP
GLUCOSE BLDC GLUCOMTR-MCNC: 104 MG/DL — HIGH (ref 70–99)
GLUCOSE BLDC GLUCOMTR-MCNC: 87 MG/DL — SIGNIFICANT CHANGE UP (ref 70–99)
GLUCOSE BLDC GLUCOMTR-MCNC: 92 MG/DL — SIGNIFICANT CHANGE UP (ref 70–99)

## 2019-11-12 PROCEDURE — 88341 IMHCHEM/IMCYTCHM EA ADD ANTB: CPT | Mod: 26

## 2019-11-12 PROCEDURE — 88342 IMHCHEM/IMCYTCHM 1ST ANTB: CPT | Mod: 26

## 2019-11-12 PROCEDURE — 88112 CYTOPATH CELL ENHANCE TECH: CPT | Mod: 26

## 2019-11-12 PROCEDURE — 38900 IO MAP OF SENT LYMPH NODE: CPT | Mod: 50

## 2019-11-12 PROCEDURE — 58571 TLH W/T/O 250 G OR LESS: CPT

## 2019-11-12 PROCEDURE — 38900 IO MAP OF SENT LYMPH NODE: CPT | Mod: AS,50

## 2019-11-12 PROCEDURE — 88307 TISSUE EXAM BY PATHOLOGIST: CPT | Mod: 26

## 2019-11-12 PROCEDURE — 38570 LAPAROSCOPY LYMPH NODE BIOP: CPT

## 2019-11-12 PROCEDURE — 58571 TLH W/T/O 250 G OR LESS: CPT | Mod: AS

## 2019-11-12 PROCEDURE — 38570 LAPAROSCOPY LYMPH NODE BIOP: CPT | Mod: AS

## 2019-11-12 RX ORDER — ENOXAPARIN SODIUM 100 MG/ML
40 INJECTION SUBCUTANEOUS EVERY 24 HOURS
Refills: 0 | Status: DISCONTINUED | OUTPATIENT
Start: 2019-11-12 | End: 2019-11-13

## 2019-11-12 RX ORDER — OXYCODONE AND ACETAMINOPHEN 5; 325 MG/1; MG/1
2 TABLET ORAL EVERY 4 HOURS
Refills: 0 | Status: DISCONTINUED | OUTPATIENT
Start: 2019-11-12 | End: 2019-11-13

## 2019-11-12 RX ORDER — ALPRAZOLAM 0.25 MG
0.25 TABLET ORAL DAILY
Refills: 0 | Status: DISCONTINUED | OUTPATIENT
Start: 2019-11-12 | End: 2019-11-13

## 2019-11-12 RX ORDER — ESCITALOPRAM OXALATE 10 MG/1
10 TABLET, FILM COATED ORAL
Refills: 0 | Status: DISCONTINUED | OUTPATIENT
Start: 2019-11-12 | End: 2019-11-12

## 2019-11-12 RX ORDER — POLYETHYLENE GLYCOL 3350 17 G/17G
0 POWDER, FOR SOLUTION ORAL
Qty: 0 | Refills: 0 | DISCHARGE

## 2019-11-12 RX ORDER — CEFOTETAN DISODIUM 1 G
1 VIAL (EA) INJECTION ONCE
Refills: 0 | Status: COMPLETED | OUTPATIENT
Start: 2019-11-12 | End: 2019-11-12

## 2019-11-12 RX ORDER — OXYCODONE AND ACETAMINOPHEN 5; 325 MG/1; MG/1
1 TABLET ORAL EVERY 4 HOURS
Refills: 0 | Status: DISCONTINUED | OUTPATIENT
Start: 2019-11-12 | End: 2019-11-13

## 2019-11-12 RX ORDER — GABAPENTIN 400 MG/1
1 CAPSULE ORAL
Qty: 0 | Refills: 0 | DISCHARGE

## 2019-11-12 RX ORDER — MULTIVIT WITH MIN/MFOLATE/K2 340-15/3 G
0 POWDER (GRAM) ORAL
Qty: 0 | Refills: 0 | DISCHARGE

## 2019-11-12 RX ORDER — ESCITALOPRAM OXALATE 10 MG/1
30 TABLET, FILM COATED ORAL DAILY
Refills: 0 | Status: DISCONTINUED | OUTPATIENT
Start: 2019-11-12 | End: 2019-11-13

## 2019-11-12 RX ORDER — SODIUM CHLORIDE 9 MG/ML
1000 INJECTION, SOLUTION INTRAVENOUS
Refills: 0 | Status: DISCONTINUED | OUTPATIENT
Start: 2019-11-12 | End: 2019-11-12

## 2019-11-12 RX ORDER — ONDANSETRON 8 MG/1
4 TABLET, FILM COATED ORAL EVERY 6 HOURS
Refills: 0 | Status: DISCONTINUED | OUTPATIENT
Start: 2019-11-12 | End: 2019-11-13

## 2019-11-12 RX ORDER — FENTANYL CITRATE 50 UG/ML
50 INJECTION INTRAVENOUS
Refills: 0 | Status: DISCONTINUED | OUTPATIENT
Start: 2019-11-12 | End: 2019-11-12

## 2019-11-12 RX ORDER — TOBRAMYCIN 0.3 %
1 DROPS OPHTHALMIC (EYE)
Qty: 0 | Refills: 0 | DISCHARGE

## 2019-11-12 RX ORDER — OXYCODONE HYDROCHLORIDE 5 MG/1
1 TABLET ORAL
Qty: 20 | Refills: 0
Start: 2019-11-12 | End: 2019-11-16

## 2019-11-12 RX ORDER — ESCITALOPRAM OXALATE 10 MG/1
3 TABLET, FILM COATED ORAL
Qty: 0 | Refills: 0 | DISCHARGE

## 2019-11-12 RX ORDER — DICLOFENAC SODIUM 30 MG/G
0 GEL TOPICAL
Qty: 0 | Refills: 0 | DISCHARGE

## 2019-11-12 RX ORDER — KETOROLAC TROMETHAMINE 30 MG/ML
30 SYRINGE (ML) INJECTION EVERY 6 HOURS
Refills: 0 | Status: COMPLETED | OUTPATIENT
Start: 2019-11-12 | End: 2019-11-13

## 2019-11-12 RX ORDER — ALPRAZOLAM 0.25 MG
1 TABLET ORAL
Qty: 0 | Refills: 0 | DISCHARGE

## 2019-11-12 RX ORDER — SIMVASTATIN 20 MG/1
10 TABLET, FILM COATED ORAL AT BEDTIME
Refills: 0 | Status: DISCONTINUED | OUTPATIENT
Start: 2019-11-12 | End: 2019-11-13

## 2019-11-12 RX ORDER — ONDANSETRON 8 MG/1
4 TABLET, FILM COATED ORAL ONCE
Refills: 0 | Status: DISCONTINUED | OUTPATIENT
Start: 2019-11-12 | End: 2019-11-12

## 2019-11-12 RX ORDER — GABAPENTIN 400 MG/1
300 CAPSULE ORAL AT BEDTIME
Refills: 0 | Status: DISCONTINUED | OUTPATIENT
Start: 2019-11-12 | End: 2019-11-13

## 2019-11-12 RX ORDER — DICLOFENAC SODIUM 75 MG/1
1 TABLET, DELAYED RELEASE ORAL
Qty: 0 | Refills: 0 | DISCHARGE

## 2019-11-12 RX ORDER — DEXTROSE MONOHYDRATE, SODIUM CHLORIDE, AND POTASSIUM CHLORIDE 50; .745; 4.5 G/1000ML; G/1000ML; G/1000ML
1000 INJECTION, SOLUTION INTRAVENOUS
Refills: 0 | Status: DISCONTINUED | OUTPATIENT
Start: 2019-11-12 | End: 2019-11-13

## 2019-11-12 RX ORDER — HYDROCORTISONE 1 %
1 OINTMENT (GRAM) TOPICAL
Qty: 0 | Refills: 0 | DISCHARGE

## 2019-11-12 RX ORDER — SIMVASTATIN 20 MG/1
1 TABLET, FILM COATED ORAL
Qty: 0 | Refills: 0 | DISCHARGE

## 2019-11-12 RX ORDER — CEFOTETAN DISODIUM 1 G
2 VIAL (EA) INJECTION ONCE
Refills: 0 | Status: COMPLETED | OUTPATIENT
Start: 2019-11-12 | End: 2019-11-12

## 2019-11-12 RX ADMIN — DEXTROSE MONOHYDRATE, SODIUM CHLORIDE, AND POTASSIUM CHLORIDE 125 MILLILITER(S): 50; .745; 4.5 INJECTION, SOLUTION INTRAVENOUS at 17:27

## 2019-11-12 RX ADMIN — FENTANYL CITRATE 50 MICROGRAM(S): 50 INJECTION INTRAVENOUS at 10:12

## 2019-11-12 RX ADMIN — Medication 30 MILLIGRAM(S): at 23:21

## 2019-11-12 RX ADMIN — ONDANSETRON 4 MILLIGRAM(S): 8 TABLET, FILM COATED ORAL at 12:36

## 2019-11-12 RX ADMIN — Medication 30 MILLIGRAM(S): at 17:25

## 2019-11-12 RX ADMIN — Medication 30 MILLIGRAM(S): at 13:00

## 2019-11-12 RX ADMIN — Medication 100 GRAM(S): at 17:27

## 2019-11-12 RX ADMIN — ENOXAPARIN SODIUM 40 MILLIGRAM(S): 100 INJECTION SUBCUTANEOUS at 17:27

## 2019-11-12 RX ADMIN — SIMVASTATIN 10 MILLIGRAM(S): 20 TABLET, FILM COATED ORAL at 21:27

## 2019-11-12 RX ADMIN — Medication 30 MILLIGRAM(S): at 17:45

## 2019-11-12 RX ADMIN — SODIUM CHLORIDE 3 MILLILITER(S): 9 INJECTION INTRAMUSCULAR; INTRAVENOUS; SUBCUTANEOUS at 20:58

## 2019-11-12 RX ADMIN — Medication 100 GRAM(S): at 08:02

## 2019-11-12 RX ADMIN — Medication 30 MILLIGRAM(S): at 12:36

## 2019-11-12 RX ADMIN — GABAPENTIN 300 MILLIGRAM(S): 400 CAPSULE ORAL at 21:27

## 2019-11-12 RX ADMIN — FENTANYL CITRATE 50 MICROGRAM(S): 50 INJECTION INTRAVENOUS at 10:02

## 2019-11-12 RX ADMIN — FENTANYL CITRATE 50 MICROGRAM(S): 50 INJECTION INTRAVENOUS at 10:01

## 2019-11-12 RX ADMIN — Medication 30 MILLIGRAM(S): at 23:36

## 2019-11-12 RX ADMIN — FENTANYL CITRATE 50 MICROGRAM(S): 50 INJECTION INTRAVENOUS at 10:13

## 2019-11-12 RX ADMIN — SODIUM CHLORIDE 3 MILLILITER(S): 9 INJECTION INTRAMUSCULAR; INTRAVENOUS; SUBCUTANEOUS at 11:50

## 2019-11-12 NOTE — BRIEF OPERATIVE NOTE - NSICDXBRIEFPROCEDURE_GEN_ALL_CORE_FT
PROCEDURES:  Pelvic and para-aortic lymphadenectomy 12-Nov-2019 09:41:47  Nino Prince  Salpingoophorectomy, bilateral, laparoscopic , at same operative episode 12-Nov-2019 09:41:38  Nino Prince  Robot-assisted total laparoscopic hysterectomy with cystoscopy 12-Nov-2019 09:41:27  Nino Prince

## 2019-11-12 NOTE — DISCHARGE NOTE PROVIDER - HOSPITAL COURSE
Patient post-operatively had an uncomplicated hospital course. Her pain was well controlled. She is tolerating a regular diet. She is ambulating independently. She was able to void after removal of fritz. Labs and Vitals WNL upon discharge.

## 2019-11-12 NOTE — DISCHARGE NOTE PROVIDER - NSDCMRMEDTOKEN_GEN_ALL_CORE_FT
acetaminophen-oxycodone 325 mg-5 mg oral tablet: 1 tab(s) orally every 6 hours, As Needed -for severe pain MDD:4 tablets  ALPRAZolam 0.25 mg oral tablet: 1 tab(s) orally once a day, As Needed  clobetasol 0.05% topical ointment: Apply topically to affected area 2 times a day  escitalopram 10 mg oral tablet: 3 tab(s) orally once a day  gabapentin 300 mg oral capsule: 1 cap(s) orally once a day (at bedtime)  Hydrocortisone AC 1% topical cream: Apply topically to affected area 3 times a day  magnesium citrate 100 mg oral tablet: orally once a day  MiraLax oral powder for reconstitution: orally once a day  Naprosyn 500 mg oral tablet: 1 tab(s) orally 2 times a day   simvastatin 10 mg oral tablet: 1 tab(s) orally once a day (at bedtime)  tobramycin 0.3% ophthalmic ointment: 1 application to each affected eye 3 times a day

## 2019-11-12 NOTE — PROGRESS NOTE ADULT - SUBJECTIVE AND OBJECTIVE BOX
GYNECOLOGIC ONCOLOGY PROGRESS NOTE    POD#0    PROBLEMS:  Anxiety and depression  Need for prophylactic measure  Endometrial intraepithelial neoplasia (EIN)    Pt seen and examined at bedside. She reports nausea, she has not yet received any zofran.    SUBJECTIVE:    Patient is without complaints.  Pain well-controlled.  Denies Vomiting or Diarrhea.  Denies shortness of breath, chest pain or dyspnea on exertion.  Pt. has not yet tried to eat anything, tolerating clears.     OBJECTIVE:     VITALS:  T(F): 97.9 (11-12-19 @ 11:47), Max: 97.9 (11-12-19 @ 09:42)  HR: 79 (11-12-19 @ 11:47) (57 - 79)  BP: 135/74 (11-12-19 @ 11:47) (128/73 - 172/78)  RR: 18 (11-12-19 @ 11:47) (10 - 18)  SpO2: 93% (11-12-19 @ 11:47) (93% - 100%)    I&O's Summary  Ivan: removed in OR, patient reports large volume void in PACU: not documented-will reach out to RN to confirm.     MEDICATIONS  (STANDING):  cefoTEtan  IVPB 1 Gram(s) IV Intermittent once  dextrose 5% + sodium chloride 0.45% with potassium chloride 20 mEq/L 1000 milliLiter(s) (125 mL/Hr) IV Continuous <Continuous>  enoxaparin Injectable 40 milliGRAM(s) SubCutaneous every 24 hours  escitalopram 10 milliGRAM(s) Oral <User Schedule>  gabapentin 300 milliGRAM(s) Oral at bedtime  ketorolac   Injectable 30 milliGRAM(s) IV Push every 6 hours  simvastatin 10 milliGRAM(s) Oral at bedtime  sodium chloride 0.9% lock flush 3 milliLiter(s) IV Push every 8 hours    MEDICATIONS  (PRN):  ALPRAZolam 0.25 milliGRAM(s) Oral daily PRN anxiety  ondansetron Injectable 4 milliGRAM(s) IV Push every 6 hours PRN Postoperative Nausea and/or Vomiting  oxycodone    5 mG/acetaminophen 325 mG 1 Tablet(s) Oral every 4 hours PRN Moderate Pain (4 - 6)  oxycodone    5 mG/acetaminophen 325 mG 2 Tablet(s) Oral every 4 hours PRN Severe Pain (7 - 10)      Physical Exam:  Constitutional: NAD  Pulmonary: clear to auscultation bilaterally   Cardiovascular: Regular rate and rhythm   Abdomen: soft, non-tender, non-distended, normal bowel sounds  Extremities: no lower extremity edema or calve tenderness, Shad's sign negative.  Incision: Clean, dry, intact.  Without signs of infection or hernia.

## 2019-11-12 NOTE — DISCHARGE NOTE PROVIDER - NSDCACTIVITY_GEN_ALL_CORE
Stairs allowed/Walking - Indoors allowed/Walking - Outdoors allowed/Showering allowed/No heavy lifting/straining

## 2019-11-12 NOTE — DISCHARGE NOTE PROVIDER - NSDCFUADDAPPT_GEN_ALL_CORE_FT
Please follow-up with PA in one week for post-op visit/removal of sutures.  Follow-up with Dr. Mishra in two weeks to review pathology report.      Please contact your provider for any pain uncontrolled by medication, excessive bleeding or Fever>100.4  Please take naproxen-1 tablet every 12 hours x 3 days, may take percocet as prescribed for breakthrough pain.

## 2019-11-12 NOTE — DISCHARGE NOTE PROVIDER - NSDCFUADDINST_GEN_ALL_CORE_FT
May walk and climb stairs as often as youd like, no vigorous activity, do not lift anything greater than 10lbs, nothing per vagina x 6 weeks, do not drive while on pain medication.

## 2019-11-12 NOTE — DISCHARGE NOTE PROVIDER - NSDCCPCAREPLAN_GEN_ALL_CORE_FT
PRINCIPAL DISCHARGE DIAGNOSIS  Diagnosis: EIN (endometrial intraepithelial neoplasia)  Assessment and Plan of Treatment:

## 2019-11-12 NOTE — DISCHARGE NOTE PROVIDER - NSDCFUSCHEDAPPT_GEN_ALL_CORE_FT
JOY CABALLERO ; 11/18/2019 ; [facility]/ [facility] [place] ; 11/25/2019 ; [facility] OB/ JOY Dalal ; 12/27/2019 ; [facility] [address] JOY CABALLERO ; 11/18/2019 ; [facility] OB/ [facility] [place] ; 11/25/2019 ; [facility] OB/ JOY Dalal ; 12/27/2019 ; [facility] [address] [facility]

## 2019-11-12 NOTE — BRIEF OPERATIVE NOTE - OPERATION/FINDINGS
Grossly normal uterus, fallopian tubes and ovaries bilaterally. Grossly normal liver edge. Bilateral ureteral jets visualized, grossly normal bladder uroepithelium

## 2019-11-12 NOTE — PROGRESS NOTE ADULT - PROBLEM SELECTOR PLAN 1
Patient recovering well post-operatively   Continue lovenox and SCD's for DVT ppx  Abx x 1 post-op for ppx of infection  Continue IVF at 125cc/hr, will IV lock in AM if continues to void with adequate output  Home meds resumed  Ambulate as tolerated  Will encourage IS use  Reg DASH/TLC diet  F/u AM labs  Otherwise routine post-op care

## 2019-11-12 NOTE — DISCHARGE NOTE PROVIDER - CARE PROVIDER_API CALL
Nino Mishra)  Gynecologic Oncology; Obstetrics and Gynecology  [address] [place] [address]  Phone: [phone]  Fax: [phone]  Follow Up Time:

## 2019-11-12 NOTE — BRIEF OPERATIVE NOTE - SPECIMENS
Uterus/Cervix/Fallopian tubes/Ovaries, Left and right pelvic sentinel lymph nodes, para aortic lymph nodes

## 2019-11-13 ENCOUNTER — TRANSCRIPTION ENCOUNTER (OUTPATIENT)
Age: 54
End: 2019-11-13

## 2019-11-13 VITALS
OXYGEN SATURATION: 98 % | SYSTOLIC BLOOD PRESSURE: 130 MMHG | RESPIRATION RATE: 17 BRPM | DIASTOLIC BLOOD PRESSURE: 82 MMHG | HEART RATE: 60 BPM | TEMPERATURE: 98 F

## 2019-11-13 LAB
ANION GAP SERPL CALC-SCNC: 11 MMOL/L — SIGNIFICANT CHANGE UP (ref 5–17)
BASOPHILS # BLD AUTO: 0.03 K/UL — SIGNIFICANT CHANGE UP (ref 0–0.2)
BASOPHILS NFR BLD AUTO: 0.3 % — SIGNIFICANT CHANGE UP (ref 0–2)
BUN SERPL-MCNC: 13 MG/DL — SIGNIFICANT CHANGE UP (ref 8–20)
CALCIUM SERPL-MCNC: 9 MG/DL — SIGNIFICANT CHANGE UP (ref 8.6–10.2)
CHLORIDE SERPL-SCNC: 101 MMOL/L — SIGNIFICANT CHANGE UP (ref 98–107)
CO2 SERPL-SCNC: 26 MMOL/L — SIGNIFICANT CHANGE UP (ref 22–29)
CREAT SERPL-MCNC: 0.89 MG/DL — SIGNIFICANT CHANGE UP (ref 0.5–1.3)
EOSINOPHIL # BLD AUTO: 0.04 K/UL — SIGNIFICANT CHANGE UP (ref 0–0.5)
EOSINOPHIL NFR BLD AUTO: 0.4 % — SIGNIFICANT CHANGE UP (ref 0–6)
GLUCOSE SERPL-MCNC: 93 MG/DL — SIGNIFICANT CHANGE UP (ref 70–115)
HCT VFR BLD CALC: 32.5 % — LOW (ref 34.5–45)
HGB BLD-MCNC: 10.8 G/DL — LOW (ref 11.5–15.5)
IMM GRANULOCYTES NFR BLD AUTO: 0.6 % — SIGNIFICANT CHANGE UP (ref 0–1.5)
LYMPHOCYTES # BLD AUTO: 1.41 K/UL — SIGNIFICANT CHANGE UP (ref 1–3.3)
LYMPHOCYTES # BLD AUTO: 12.6 % — LOW (ref 13–44)
MAGNESIUM SERPL-MCNC: 1.9 MG/DL — SIGNIFICANT CHANGE UP (ref 1.6–2.6)
MCHC RBC-ENTMCNC: 31.2 PG — SIGNIFICANT CHANGE UP (ref 27–34)
MCHC RBC-ENTMCNC: 33.2 GM/DL — SIGNIFICANT CHANGE UP (ref 32–36)
MCV RBC AUTO: 93.9 FL — SIGNIFICANT CHANGE UP (ref 80–100)
MONOCYTES # BLD AUTO: 0.91 K/UL — HIGH (ref 0–0.9)
MONOCYTES NFR BLD AUTO: 8.1 % — SIGNIFICANT CHANGE UP (ref 2–14)
NEUTROPHILS # BLD AUTO: 8.76 K/UL — HIGH (ref 1.8–7.4)
NEUTROPHILS NFR BLD AUTO: 78 % — HIGH (ref 43–77)
PLATELET # BLD AUTO: 247 K/UL — SIGNIFICANT CHANGE UP (ref 150–400)
POTASSIUM SERPL-MCNC: 4.4 MMOL/L — SIGNIFICANT CHANGE UP (ref 3.5–5.3)
POTASSIUM SERPL-SCNC: 4.4 MMOL/L — SIGNIFICANT CHANGE UP (ref 3.5–5.3)
RBC # BLD: 3.46 M/UL — LOW (ref 3.8–5.2)
RBC # FLD: 12.8 % — SIGNIFICANT CHANGE UP (ref 10.3–14.5)
SODIUM SERPL-SCNC: 138 MMOL/L — SIGNIFICANT CHANGE UP (ref 135–145)
WBC # BLD: 11.22 K/UL — HIGH (ref 3.8–10.5)
WBC # FLD AUTO: 11.22 K/UL — HIGH (ref 3.8–10.5)

## 2019-11-13 PROCEDURE — 36415 COLL VENOUS BLD VENIPUNCTURE: CPT

## 2019-11-13 PROCEDURE — 88341 IMHCHEM/IMCYTCHM EA ADD ANTB: CPT

## 2019-11-13 PROCEDURE — 85027 COMPLETE CBC AUTOMATED: CPT

## 2019-11-13 PROCEDURE — 83735 ASSAY OF MAGNESIUM: CPT

## 2019-11-13 PROCEDURE — 80048 BASIC METABOLIC PNL TOTAL CA: CPT

## 2019-11-13 PROCEDURE — 88307 TISSUE EXAM BY PATHOLOGIST: CPT

## 2019-11-13 PROCEDURE — 82962 GLUCOSE BLOOD TEST: CPT

## 2019-11-13 PROCEDURE — 88112 CYTOPATH CELL ENHANCE TECH: CPT

## 2019-11-13 PROCEDURE — 88342 IMHCHEM/IMCYTCHM 1ST ANTB: CPT

## 2019-11-13 PROCEDURE — S2900: CPT

## 2019-11-13 RX ADMIN — Medication 30 MILLIGRAM(S): at 12:39

## 2019-11-13 RX ADMIN — SODIUM CHLORIDE 3 MILLILITER(S): 9 INJECTION INTRAMUSCULAR; INTRAVENOUS; SUBCUTANEOUS at 05:16

## 2019-11-13 RX ADMIN — ESCITALOPRAM OXALATE 30 MILLIGRAM(S): 10 TABLET, FILM COATED ORAL at 08:40

## 2019-11-13 RX ADMIN — Medication 30 MILLIGRAM(S): at 12:55

## 2019-11-13 RX ADMIN — Medication 30 MILLIGRAM(S): at 05:17

## 2019-11-13 NOTE — DISCHARGE NOTE NURSING/CASE MANAGEMENT/SOCIAL WORK - PATIENT PORTAL LINK FT
You can access the FollowMyHealth Patient Portal offered by  by registering at the following website: [email or web address]. By joining [facility]’s FollowMyHealth portal, you will also be able to view your health information using other applications (apps) compatible with our system.

## 2019-11-13 NOTE — PROGRESS NOTE ADULT - PROBLEM SELECTOR PLAN 1
Patient appropriate for dc home  DC instructions reviewed with patient, all questions answered  Scritps for percocet and naproxen provided for pain control  Patient to see PA in office in 1 week for post-op check Patient appropriate for dc home  DC instructions reviewed with patient, all questions answered  Scritps for percocet and naproxen provided for pain control  Patient to see PA in office in 1 week for post-op check  Case discussed with Dr. Mishra

## 2019-11-13 NOTE — PROGRESS NOTE ADULT - SUBJECTIVE AND OBJECTIVE BOX
GYNECOLOGIC ONCOLOGY PROGRESS NOTE    POD#1    PROBLEMS:  Anxiety and depression  Need for prophylactic measure  Endometrial intraepithelial neoplasia (EIN)  HLD  OA    Pt seen and examined at bedside.     SUBJECTIVE:    Patient is without complaints.  Pain well-controlled.  Denies Nausea, Vomiting or Diarrhea.  Denies shortness of breath, chest pain or dyspnea on exertion.  Tolerating diet.    OBJECTIVE:     VITALS:  T(F): 98.2 (11-13-19 @ 07:03), Max: 98.6 (11-12-19 @ 16:50)  HR: 61 (11-13-19 @ 07:03) (57 - 79)  BP: 145/81 (11-13-19 @ 07:03) (111/68 - 172/78)  RR: 18 (11-13-19 @ 07:03) (10 - 18)  SpO2: 97% (11-13-19 @ 07:03) (93% - 100%)    I&O's Summary    Ivan removed in OR, patient with multiple voids since removal and adequate output.    MEDICATIONS  (STANDING):  dextrose 5% + sodium chloride 0.45% with potassium chloride 20 mEq/L 1000 milliLiter(s) (125 mL/Hr) IV Continuous <Continuous>  enoxaparin Injectable 40 milliGRAM(s) SubCutaneous every 24 hours  escitalopram 30 milliGRAM(s) Oral daily  gabapentin 300 milliGRAM(s) Oral at bedtime  ketorolac   Injectable 30 milliGRAM(s) IV Push every 6 hours  simvastatin 10 milliGRAM(s) Oral at bedtime  sodium chloride 0.9% lock flush 3 milliLiter(s) IV Push every 8 hours    MEDICATIONS  (PRN):  ALPRAZolam 0.25 milliGRAM(s) Oral daily PRN anxiety  ondansetron Injectable 4 milliGRAM(s) IV Push every 6 hours PRN Postoperative Nausea and/or Vomiting  oxycodone    5 mG/acetaminophen 325 mG 1 Tablet(s) Oral every 4 hours PRN Moderate Pain (4 - 6)  oxycodone    5 mG/acetaminophen 325 mG 2 Tablet(s) Oral every 4 hours PRN Severe Pain (7 - 10)      Physical Exam:  Constitutional: NAD  Pulmonary: clear to auscultation bilaterally   Cardiovascular: Regular rate and rhythm   Abdomen: soft, non-tender, non-distended, normal bowel sounds  Extremities: no lower extremity edema or calve tenderness, Shad's sign negative.  Incision: Clean, dry, intact.  Without signs of infection or hernia.      LABS:                        10.8   11.22 )-----------( 247      ( 13 Nov 2019 07:11 )             32.5     11-13    138  |  101  |  13.0  ----------------------------<  93  4.4   |  26.0  |  0.89    Ca    9.0      13 Nov 2019 07:11  Mg     1.9     11-13

## 2019-11-14 LAB — NON-GYNECOLOGICAL CYTOLOGY STUDY: SIGNIFICANT CHANGE UP

## 2019-11-15 ENCOUNTER — RESULT REVIEW (OUTPATIENT)
Age: 54
End: 2019-11-15

## 2019-11-18 ENCOUNTER — APPOINTMENT (OUTPATIENT)
Dept: GYNECOLOGIC ONCOLOGY | Facility: CLINIC | Age: 54
End: 2019-11-18
Payer: COMMERCIAL

## 2019-11-18 VITALS
HEART RATE: 61 BPM | OXYGEN SATURATION: 99 % | SYSTOLIC BLOOD PRESSURE: 120 MMHG | TEMPERATURE: 97.4 F | DIASTOLIC BLOOD PRESSURE: 78 MMHG

## 2019-11-18 PROCEDURE — 99024 POSTOP FOLLOW-UP VISIT: CPT

## 2019-11-18 NOTE — DISCUSSION/SUMMARY
[Clean] : was clean [Intact] : was intact [Dry] : was dry [Sutures Intact] : had sutures  intact [None] : had no drainage [Normal Skin] : normal appearance [Excellent Pain Control] : has excellent pain control [Doing Well] : is doing well [No Sign of Infection] : is showing no signs of infection [Remove Sutures/Staples] : removed sutures/staples [Clinical Recheck] : clinical recheck [Erythema] : was not erythematous [Ecchymosis] : was not ecchymotic [Firm] : soft [Abnormal Bowel Sounds] : normal bowel sounds [Tender] : nontender [de-identified] : Lungs-CTA b/l, CV-NSR, S1 S2

## 2019-11-18 NOTE — REASON FOR VISIT
[Post Op] : post op visit [de-identified] : REGINE TLH BSO, SLNM, cystoscopy, PW [de-identified] : 11/12/19 [de-identified] : Patient feels well overall. Denies fever, cp, sob or n/v. Bowel and bladder function is normal. Pain is well controlled.

## 2019-11-19 ENCOUNTER — RESULT REVIEW (OUTPATIENT)
Age: 54
End: 2019-11-19

## 2019-11-19 LAB — SURGICAL PATHOLOGY STUDY: SIGNIFICANT CHANGE UP

## 2019-11-25 ENCOUNTER — APPOINTMENT (OUTPATIENT)
Dept: GYNECOLOGIC ONCOLOGY | Facility: CLINIC | Age: 54
End: 2019-11-25
Payer: COMMERCIAL

## 2019-11-25 VITALS
OXYGEN SATURATION: 100 % | RESPIRATION RATE: 16 BRPM | WEIGHT: 158 LBS | BODY MASS INDEX: 25.39 KG/M2 | HEART RATE: 64 BPM | SYSTOLIC BLOOD PRESSURE: 118 MMHG | DIASTOLIC BLOOD PRESSURE: 81 MMHG | HEIGHT: 66 IN

## 2019-11-25 PROBLEM — E78.00 PURE HYPERCHOLESTEROLEMIA, UNSPECIFIED: Chronic | Status: ACTIVE | Noted: 2019-10-22

## 2019-11-25 PROBLEM — L40.50 ARTHROPATHIC PSORIASIS, UNSPECIFIED: Chronic | Status: ACTIVE | Noted: 2019-10-22

## 2019-11-25 PROBLEM — F41.9 ANXIETY DISORDER, UNSPECIFIED: Chronic | Status: ACTIVE | Noted: 2019-10-22

## 2019-11-25 PROBLEM — M19.90 UNSPECIFIED OSTEOARTHRITIS, UNSPECIFIED SITE: Chronic | Status: ACTIVE | Noted: 2019-10-22

## 2019-11-25 PROBLEM — L40.9 PSORIASIS, UNSPECIFIED: Chronic | Status: ACTIVE | Noted: 2019-10-22

## 2019-11-25 PROCEDURE — 99024 POSTOP FOLLOW-UP VISIT: CPT

## 2019-11-25 NOTE — PATIENT PROFILE ADULT - NSPROPTRIGHTNOTIFY_GEN_A_NUR
Quality 47: Advance Care Plan: Advance Care Planning discussed and documented; advance care plan or surrogate decision maker documented in the medical record. Detail Level: Zone declines

## 2019-11-25 NOTE — DISCUSSION/SUMMARY
[Findings] : These findings were discussed with [unfilled] in detail. She understood and accepted the rationale for this recommendation and also understood the serious impact that these findings could have upon her prognosis for survival. [FreeTextEntry1] : Final pathology well differentiated adenocarcinoma, FIGO 1, arising in a background of a complex hyperplasia with atypia EIN. Stage 1A grade 1. Negative pelvic washings.

## 2019-11-25 NOTE — REASON FOR VISIT
[Post Op] : post op visit [de-identified] : REGINE TLH BSO, SLNM, cystoscopy, PW  [de-identified] : 11/12/2019

## 2019-12-06 LAB
ALBUMIN SERPL ELPH-MCNC: 4.5 G/DL
ALP BLD-CCNC: 85 U/L
ALT SERPL-CCNC: 16 U/L
ANION GAP SERPL CALC-SCNC: 11 MMOL/L
AST SERPL-CCNC: 16 U/L
BASOPHILS # BLD AUTO: 0.04 K/UL
BASOPHILS NFR BLD AUTO: 0.5 %
BILIRUB SERPL-MCNC: 0.3 MG/DL
BUN SERPL-MCNC: 16 MG/DL
CALCIUM SERPL-MCNC: 10.1 MG/DL
CHLORIDE SERPL-SCNC: 99 MMOL/L
CHROMATIN AB SERPL-ACNC: <0.2 AL
CO2 SERPL-SCNC: 30 MMOL/L
CREAT SERPL-MCNC: 0.9 MG/DL
CRP SERPL-MCNC: 0.14 MG/DL
EOSINOPHIL # BLD AUTO: 0.04 K/UL
EOSINOPHIL NFR BLD AUTO: 0.5 %
ERYTHROCYTE [SEDIMENTATION RATE] IN BLOOD BY WESTERGREN METHOD: 21 MM/HR
GLUCOSE SERPL-MCNC: 94 MG/DL
HCT VFR BLD CALC: 46.1 %
HGB BLD-MCNC: 14.2 G/DL
IL17A SERPL-MCNC: <5 PG/ML
IMM GRANULOCYTES NFR BLD AUTO: 0.2 %
LYMPHOCYTES # BLD AUTO: 1.09 K/UL
LYMPHOCYTES NFR BLD AUTO: 12.4 %
MAN DIFF?: NORMAL
MCHC RBC-ENTMCNC: 30.5 PG
MCHC RBC-ENTMCNC: 30.8 GM/DL
MCV RBC AUTO: 99.1 FL
MONOCYTES # BLD AUTO: 0.5 K/UL
MONOCYTES NFR BLD AUTO: 5.7 %
NEUTROPHILS # BLD AUTO: 7.11 K/UL
NEUTROPHILS NFR BLD AUTO: 80.7 %
PLATELET # BLD AUTO: 322 K/UL
POTASSIUM SERPL-SCNC: 4.6 MMOL/L
PROT SERPL-MCNC: 7.3 G/DL
RBC # BLD: 4.65 M/UL
RBC # FLD: 13.4 %
SODIUM SERPL-SCNC: 140 MMOL/L
WBC # FLD AUTO: 8.8 K/UL

## 2019-12-27 ENCOUNTER — APPOINTMENT (OUTPATIENT)
Dept: RHEUMATOLOGY | Facility: CLINIC | Age: 54
End: 2019-12-27
Payer: COMMERCIAL

## 2019-12-27 VITALS
OXYGEN SATURATION: 99 % | HEIGHT: 66 IN | TEMPERATURE: 98.3 F | SYSTOLIC BLOOD PRESSURE: 111 MMHG | DIASTOLIC BLOOD PRESSURE: 73 MMHG | HEART RATE: 64 BPM | WEIGHT: 160 LBS | BODY MASS INDEX: 25.71 KG/M2

## 2019-12-27 PROCEDURE — 36415 COLL VENOUS BLD VENIPUNCTURE: CPT

## 2019-12-27 PROCEDURE — 99215 OFFICE O/P EST HI 40 MIN: CPT | Mod: 25

## 2020-02-03 ENCOUNTER — APPOINTMENT (OUTPATIENT)
Dept: GYNECOLOGIC ONCOLOGY | Facility: CLINIC | Age: 55
End: 2020-02-03
Payer: COMMERCIAL

## 2020-02-03 VITALS
WEIGHT: 160 LBS | OXYGEN SATURATION: 99 % | HEIGHT: 66 IN | SYSTOLIC BLOOD PRESSURE: 123 MMHG | BODY MASS INDEX: 25.71 KG/M2 | HEART RATE: 67 BPM | DIASTOLIC BLOOD PRESSURE: 74 MMHG | RESPIRATION RATE: 16 BRPM

## 2020-02-03 PROCEDURE — 99214 OFFICE O/P EST MOD 30 MIN: CPT | Mod: 24

## 2020-02-03 NOTE — PHYSICAL EXAM
[Abnormal] : Mood and affect: Abnormal [Anxious] : anxious [Absent] : Uterus: Absent [Normal] : Bimanual Exam: Normal [de-identified] : no palpable masses [de-identified] : Coleen Ogden was present as chaperone during examination.

## 2020-02-03 NOTE — HISTORY OF PRESENT ILLNESS
[FreeTextEntry1] : This 53yo with history of stage 1A, grade 1 endometrial adenocarcinoma contained within endometrium s/p RA TLH BSO, SNLD on 11/12/19.  Also with history of lung cancer since 2019 s/p left upper lobe wedge resection, apicoposterior segmentectomy, mediastinal lymph node dissection on 8/27/19. Pt has chronic fatigue. Has had extensive blood work done recently with no abnormalities. She has major anxiety about having recurrence of her cancers. Complains of dyspareunia and pelvic pain after intercourse. Has low libido. States the pain goes away shortly after. Denies bowel or bladder issues. Denies VB. She is scheduled for a chest ct for follow up of her lung cancer.   \par \par Bone density-Feb 2019-ordered by Dr. Angela \par Mammogram-June 2019-Bi Rad-2 -ordered by Dr. Allen \par Colonoscopy-June 2019-normal as per pt

## 2020-02-03 NOTE — REASON FOR VISIT
[FreeTextEntry1] : [facility]\par \par [facility] Gynecologic Oncology [phone] at [address] [place] [id number]\par

## 2020-03-02 ENCOUNTER — INPATIENT (INPATIENT)
Facility: HOSPITAL | Age: 55
LOS: 0 days | Discharge: HOME | DRG: 395 | End: 2020-03-02
Attending: OBSTETRICS & GYNECOLOGY | Admitting: OBSTETRICS & GYNECOLOGY
Payer: COMMERCIAL

## 2020-03-02 VITALS
WEIGHT: 158.07 LBS | TEMPERATURE: 98 F | HEART RATE: 63 BPM | SYSTOLIC BLOOD PRESSURE: 140 MMHG | HEIGHT: 66 IN | DIASTOLIC BLOOD PRESSURE: 77 MMHG | RESPIRATION RATE: 18 BRPM | OXYGEN SATURATION: 100 %

## 2020-03-02 DIAGNOSIS — K66.8 OTHER SPECIFIED DISORDERS OF PERITONEUM: ICD-10-CM

## 2020-03-02 DIAGNOSIS — Z98.890 OTHER SPECIFIED POSTPROCEDURAL STATES: Chronic | ICD-10-CM

## 2020-03-02 LAB
ALBUMIN SERPL ELPH-MCNC: 4.5 G/DL — SIGNIFICANT CHANGE UP (ref 3.3–5.2)
ALP SERPL-CCNC: 85 U/L — SIGNIFICANT CHANGE UP (ref 40–120)
ALT FLD-CCNC: 23 U/L — SIGNIFICANT CHANGE UP
ANION GAP SERPL CALC-SCNC: 12 MMOL/L — SIGNIFICANT CHANGE UP (ref 5–17)
APPEARANCE UR: CLEAR — SIGNIFICANT CHANGE UP
APTT BLD: 33.2 SEC — SIGNIFICANT CHANGE UP (ref 27.5–36.3)
AST SERPL-CCNC: 26 U/L — SIGNIFICANT CHANGE UP
BASOPHILS # BLD AUTO: 0.04 K/UL — SIGNIFICANT CHANGE UP (ref 0–0.2)
BASOPHILS NFR BLD AUTO: 0.6 % — SIGNIFICANT CHANGE UP (ref 0–2)
BILIRUB SERPL-MCNC: 0.3 MG/DL — LOW (ref 0.4–2)
BILIRUB UR-MCNC: NEGATIVE — SIGNIFICANT CHANGE UP
BLD GP AB SCN SERPL QL: SIGNIFICANT CHANGE UP
BUN SERPL-MCNC: 17 MG/DL — SIGNIFICANT CHANGE UP (ref 8–20)
CALCIUM SERPL-MCNC: 9.9 MG/DL — SIGNIFICANT CHANGE UP (ref 8.6–10.2)
CHLORIDE SERPL-SCNC: 98 MMOL/L — SIGNIFICANT CHANGE UP (ref 98–107)
CO2 SERPL-SCNC: 28 MMOL/L — SIGNIFICANT CHANGE UP (ref 22–29)
COLOR SPEC: YELLOW — SIGNIFICANT CHANGE UP
CREAT SERPL-MCNC: 0.76 MG/DL — SIGNIFICANT CHANGE UP (ref 0.5–1.3)
DIFF PNL FLD: NEGATIVE — SIGNIFICANT CHANGE UP
EOSINOPHIL # BLD AUTO: 0.11 K/UL — SIGNIFICANT CHANGE UP (ref 0–0.5)
EOSINOPHIL NFR BLD AUTO: 1.6 % — SIGNIFICANT CHANGE UP (ref 0–6)
GLUCOSE SERPL-MCNC: 89 MG/DL — SIGNIFICANT CHANGE UP (ref 70–99)
GLUCOSE UR QL: NEGATIVE MG/DL — SIGNIFICANT CHANGE UP
HCT VFR BLD CALC: 40.2 % — SIGNIFICANT CHANGE UP (ref 34.5–45)
HGB BLD-MCNC: 13.1 G/DL — SIGNIFICANT CHANGE UP (ref 11.5–15.5)
IMM GRANULOCYTES NFR BLD AUTO: 0.1 % — SIGNIFICANT CHANGE UP (ref 0–1.5)
INR BLD: 1.05 RATIO — SIGNIFICANT CHANGE UP (ref 0.88–1.16)
KETONES UR-MCNC: NEGATIVE — SIGNIFICANT CHANGE UP
LACTATE BLDV-MCNC: 0.6 MMOL/L — SIGNIFICANT CHANGE UP (ref 0.5–2)
LEUKOCYTE ESTERASE UR-ACNC: NEGATIVE — SIGNIFICANT CHANGE UP
LYMPHOCYTES # BLD AUTO: 1.29 K/UL — SIGNIFICANT CHANGE UP (ref 1–3.3)
LYMPHOCYTES # BLD AUTO: 18.9 % — SIGNIFICANT CHANGE UP (ref 13–44)
MCHC RBC-ENTMCNC: 31.3 PG — SIGNIFICANT CHANGE UP (ref 27–34)
MCHC RBC-ENTMCNC: 32.6 GM/DL — SIGNIFICANT CHANGE UP (ref 32–36)
MCV RBC AUTO: 95.9 FL — SIGNIFICANT CHANGE UP (ref 80–100)
MONOCYTES # BLD AUTO: 0.49 K/UL — SIGNIFICANT CHANGE UP (ref 0–0.9)
MONOCYTES NFR BLD AUTO: 7.2 % — SIGNIFICANT CHANGE UP (ref 2–14)
NEUTROPHILS # BLD AUTO: 4.9 K/UL — SIGNIFICANT CHANGE UP (ref 1.8–7.4)
NEUTROPHILS NFR BLD AUTO: 71.6 % — SIGNIFICANT CHANGE UP (ref 43–77)
NITRITE UR-MCNC: NEGATIVE — SIGNIFICANT CHANGE UP
PH UR: 7 — SIGNIFICANT CHANGE UP (ref 5–8)
PLATELET # BLD AUTO: 303 K/UL — SIGNIFICANT CHANGE UP (ref 150–400)
POTASSIUM SERPL-MCNC: 4.2 MMOL/L — SIGNIFICANT CHANGE UP (ref 3.5–5.3)
POTASSIUM SERPL-SCNC: 4.2 MMOL/L — SIGNIFICANT CHANGE UP (ref 3.5–5.3)
PROT SERPL-MCNC: 7.4 G/DL — SIGNIFICANT CHANGE UP (ref 6.6–8.7)
PROT UR-MCNC: NEGATIVE MG/DL — SIGNIFICANT CHANGE UP
PROTHROM AB SERPL-ACNC: 11.9 SEC — SIGNIFICANT CHANGE UP (ref 10–12.9)
RBC # BLD: 4.19 M/UL — SIGNIFICANT CHANGE UP (ref 3.8–5.2)
RBC # FLD: 12.8 % — SIGNIFICANT CHANGE UP (ref 10.3–14.5)
SODIUM SERPL-SCNC: 138 MMOL/L — SIGNIFICANT CHANGE UP (ref 135–145)
SP GR SPEC: 1 — LOW (ref 1.01–1.02)
UROBILINOGEN FLD QL: NEGATIVE MG/DL — SIGNIFICANT CHANGE UP
WBC # BLD: 6.84 K/UL — SIGNIFICANT CHANGE UP (ref 3.8–10.5)
WBC # FLD AUTO: 6.84 K/UL — SIGNIFICANT CHANGE UP (ref 3.8–10.5)

## 2020-03-02 PROCEDURE — 86850 RBC ANTIBODY SCREEN: CPT

## 2020-03-02 PROCEDURE — 86900 BLOOD TYPING SEROLOGIC ABO: CPT

## 2020-03-02 PROCEDURE — 99283 EMERGENCY DEPT VISIT LOW MDM: CPT

## 2020-03-02 PROCEDURE — 86901 BLOOD TYPING SEROLOGIC RH(D): CPT

## 2020-03-02 PROCEDURE — 85730 THROMBOPLASTIN TIME PARTIAL: CPT

## 2020-03-02 PROCEDURE — 80053 COMPREHEN METABOLIC PANEL: CPT

## 2020-03-02 PROCEDURE — 85027 COMPLETE CBC AUTOMATED: CPT

## 2020-03-02 PROCEDURE — 36415 COLL VENOUS BLD VENIPUNCTURE: CPT

## 2020-03-02 PROCEDURE — 87086 URINE CULTURE/COLONY COUNT: CPT

## 2020-03-02 PROCEDURE — 83605 ASSAY OF LACTIC ACID: CPT

## 2020-03-02 PROCEDURE — 85610 PROTHROMBIN TIME: CPT

## 2020-03-02 PROCEDURE — 99284 EMERGENCY DEPT VISIT MOD MDM: CPT

## 2020-03-02 PROCEDURE — 81003 URINALYSIS AUTO W/O SCOPE: CPT

## 2020-03-02 NOTE — ED STATDOCS - PSH
H/O arthroscopy of knee  right 2013  History of D&C  2010, 2019  History of lung surgery  wedge restion

## 2020-03-02 NOTE — ED STATDOCS - CLINICAL SUMMARY MEDICAL DECISION MAKING FREE TEXT BOX
by report from [facility], bowel is fully intact; remnants of free air, suspect s/p intercourse from dehiscence of vaginal cuff; admit to GYN

## 2020-03-02 NOTE — ED ADULT TRIAGE NOTE - CHIEF COMPLAINT QUOTE
"I went to [facility] today and they called Dr. Mishra's office and spoke to their PA and she called me and told me to come to the ER b/c I have free air in my abdomen. " Pt arrives with disc from [facility] and report.  Pt A & OX4, denies n/v/d at this time but a week ago had diarrhea. Pt states had hysterectomy 11/12 and symptoms started after sex with , hx of lung ca

## 2020-03-02 NOTE — CONSULT NOTE ADULT - SUBJECTIVE AND OBJECTIVE BOX
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 Also has a history of lung cancer since 2019 s/p left upper lobe wedge resection, apicoposterior segmentectomy, mediastinal lymph node dissection on 8/27/19. Pt also has chronic fatigue. She has a history of anxiety. Has had complaints of dyspareunia and pelvic pain after intercourse Found to have pneumoperitoneum on outpatient radiology scan.  Denies any fevers, chills, nausea, vomiting, chest pain, shortness of breath, dizziness, headaches.     Bone density - Feb 2019 - ordered by Dr. Angela  Mammogram - June 2019 - Bi Rad 2 - ordered by Dr. 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 Also has a history of lung cancer since 2019 s/p left upper lobe wedge resection, apicoposterior segmentectomy, mediastinal lymph node dissection on 8/27/19. Pt also has chronic fatigue. She has a history of anxiety. Has had complaints of dyspareunia and pelvic pain after intercourse Found to have pneumoperitoneum on outpatient radiology scan.  Denies any fevers, chills, nausea, vomiting, chest pain, shortness of breath, dizziness, headaches.     Bone density - Feb 2019 - ordered by Dr. Angela  Mammogram - June 2019 - Bi Rad 2 - ordered by Dr. 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 Also has a history of lung cancer since 2019 s/p left upper lobe wedge resection, apicoposterior segmentectomy, mediastinal lymph node dissection on 19. Pt also has chronic fatigue. She has a history of anxiety.     12 days ago, reports dyspareunia momentarily after sex. After, she developed sharp pain/cramping in her pelvic region. Noticed pink tinging on her underwear at that time and diarrhea for a day. Tinging/diarrhea have resolved. Pain initially was 10/10 and now 2/10. Found to have pneumoperitoneum on outpatient CT with oral/Iv. Cuff appears intact. everything appears normal with the exception of air in the RUQ. Denies any fevers, chills, nausea, vomiting, chest pain, shortness of breath, dizziness, headaches.     Bone density - 2019 - ordered by Dr. Angela  Mammogram - 2019 - Bi Rad 2 - ordered by Dr. Allen  Colonoscopy - 2019 - normal as per patient    PMH - OA, hld, bipolar, depression, hld, psoriatic arthritis hsv, h/o endometrial cancer stage 1a grade 1  PSH - [facility] BSO  Med - gabapentin, acyclovir, alprazolam, clobestasol, doxepin, escitalopram, miralax, pepcid, simvastatin  All - oxycodone    Vital Signs Last 24 Hrs  T(C): 36.6 (02 Mar 2020 16:07), Max: 36.6 (02 Mar 2020 16:07)  T(F): 97.8 (02 Mar 2020 16:07), Max: 97.8 (02 Mar 2020 16:07)  HR: 63 (02 Mar 2020 16:07) (63 - 63)  BP: 140/77 (02 Mar 2020 16:07) (140/77 - 140/77)  BP(mean): --  RR: 18 (02 Mar 2020 16:07) (18 - 18)  SpO2: 100% (02 Mar 2020 16:07) (100% - 100%)     PHYSICAL EXAM:  CHEST/LUNG: Clear to percussion bilaterally; No rales, rhonchi, wheezing, or rubs  HEART: Regular rate and rhythm; No murmurs, rubs, or gallops  ABDOMEN: Soft, Nontender, Nondistended; Bowel sounds present  EXTREMITIES:  2+ Peripheral Pulses, No clubbing, cyanosis, or edema  PELVIC:        EXTERNAL GENITALIA: Normal. No rashes or lesions noted.         VAGINA: healthy pink mucosa, no gross lesions, minimal yellow nonodorous discharge, no blood noted. Vaginal cuff intact but area of thinning noted in cuff in midline.     LABS:  pending cbc, cmp, lactate    Urinalysis Basic - ( 02 Mar 2020 17:11 )    Color: Yellow / Appearance: Clear / S.005 / pH: x  Gluc: x / Ketone: Negative  / Bili: Negative / Urobili: Negative mg/dL   Blood: x / Protein: Negative mg/dL / Nitrite: Negative   Leuk Esterase: Negative / RBC: x / WBC x   Sq Epi: x / Non Sq Epi: x / Bacteria: x    RADIOLOGY STUDIES:  outpatient report read. 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 Also has a history of lung cancer since 2019 s/p left upper lobe wedge resection, apicoposterior segmentectomy, mediastinal lymph node dissection on 19. Pt also has chronic fatigue. She has a history of anxiety.     12 days ago, reports dyspareunia momentarily after sex. After, she developed sharp pain/cramping in her pelvic region. Noticed pink tinging on her underwear at that time and diarrhea for a day. Tinging/diarrhea have resolved. Pain initially was 10/10 and now 2/10. Found to have pneumoperitoneum on outpatient CT with oral/Iv. Cuff appears intact. everything appears normal with the exception of air in the RUQ. Denies any fevers, chills, nausea, vomiting, chest pain, shortness of breath, dizziness, headaches.     Bone density - 2019 - ordered by Dr. Angela  Mammogram - 2019 - Bi Rad 2 - ordered by Dr. Allen  Colonoscopy - 2019 - normal as per patient    PMH - OA, hld, bipolar, depression, hld, psoriatic arthritis hsv, h/o endometrial cancer stage 1a grade 1  PSH - [facility] BSO  Med - gabapentin, acyclovir, alprazolam, clobestasol, doxepin, escitalopram, miralax, pepcid, simvastatin  All - oxycodone    Vital Signs Last 24 Hrs  T(C): 36.6 (02 Mar 2020 16:07), Max: 36.6 (02 Mar 2020 16:07)  T(F): 97.8 (02 Mar 2020 16:07), Max: 97.8 (02 Mar 2020 16:07)  HR: 63 (02 Mar 2020 16:07) (63 - 63)  BP: 140/77 (02 Mar 2020 16:07) (140/77 - 140/77)  BP(mean): --  RR: 18 (02 Mar 2020 16:07) (18 - 18)  SpO2: 100% (02 Mar 2020 16:07) (100% - 100%)     PHYSICAL EXAM:  CHEST/LUNG: Clear to percussion bilaterally; No rales, rhonchi, wheezing, or rubs  HEART: Regular rate and rhythm; No murmurs, rubs, or gallops  ABDOMEN: Soft, Nontender, Nondistended; Bowel sounds present, no guarding, no rebound tenderness, no peritoneal signs  EXTREMITIES:  2+ Peripheral Pulses, No clubbing, cyanosis, or edema  PELVIC:        EXTERNAL GENITALIA: Normal. No rashes or lesions noted.         VAGINA: healthy pink mucosa, no gross lesions, minimal yellow nonodorous discharge, no blood noted. Vaginal cuff intact but area of thinning noted in cuff in midline.     LABS:  pending cbc, cmp, lactate    Urinalysis Basic - ( 02 Mar 2020 17:11 )    Color: Yellow / Appearance: Clear / S.005 / pH: x  Gluc: x / Ketone: Negative  / Bili: Negative / Urobili: Negative mg/dL   Blood: x / Protein: Negative mg/dL / Nitrite: Negative   Leuk Esterase: Negative / RBC: x / WBC x   Sq Epi: x / Non Sq Epi: x / Bacteria: x    RADIOLOGY STUDIES:  outpatient report read. to be scanned into system

## 2020-03-02 NOTE — ED STATDOCS - PROGRESS NOTE DETAILS
ct reviewed; originally had anticipated admission; however in light of normal labs, benign exam, and non-surgical nature of air in abd, gyn has also examined her and feels as though the cuff has spontaneoiusly healed; feels comfortable following as outpt; patient very much in agreement with this, feels well, eager for d/c, precautions discussed, ok for d/c

## 2020-03-02 NOTE — ED STATDOCS - CADM POA CENTRAL LINE
Was the patient seen in the last year in this department? Yes     Does patient have an active prescription for medications requested? No     Received Request Via: Pharmacy     Last Visit: 2/27/17    Last Labs: 3/21/16  
No

## 2020-03-02 NOTE — ED STATDOCS - PATIENT PORTAL LINK FT
You can access the FollowMyHealth Patient Portal offered by [facility] by registering at the following website: [email or web address]. By joining [employer]’s FollowMyHealth portal, you will also be able to view your health information using other applications (apps) compatible with our system.

## 2020-03-02 NOTE — CONSULT NOTE ADULT - ASSESSMENT
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 On CT scan, noted to have free air in RUQ. Pelvic exam showing thinning of cuff in midline, possible dehiscence with spontaneously healing. 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 On CT scan, noted to have free air in RUQ. Pelvic exam showing thinning of cuff in midline, possible dehiscence with spontaneously healing. Pt does not need inpatient management at this time, pending normal labs. Patient to be seen in the office for follow up.    Please re-consult if needed.  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 On CT scan, noted to have free air in RUQ. Pelvic exam showing thinning of cuff in midline, possible dehiscence that has spontaneously healed. Pt does not need inpatient management at this time, pending normal labs. Patient to be seen in the office for close follow up. D/w Dr. Mishra.    Please re-consult if needed.  Please instruct patient to call the office for a followup appointment

## 2020-03-02 NOTE — ED ADULT NURSE NOTE - OBJECTIVE STATEMENT
"I went to [facility] today and they called Dr. Mishra's office and spoke to their PA and she called me and told me to come to the ER b/c I have free air in my abdomen. " Pt arrives with disc from [facility] and report.  Pt A & OX4, denies n/v/d at this time but a week ago had diarrhea. Pt states had hysterectomy 11/12 and symptoms started after sex with , hx of lung ca

## 2020-03-02 NOTE — ED STATDOCS - OBJECTIVE STATEMENT
53 y/o F pt s/p hysterectomy (11/12/19) secondary to endometrial CA presents to ED c/o lower ABD pain. Patient states 12 days ago developed severe cramping lower ABD pain and felt as if her ABD was distended s/p sexual intercourse with her . Had ABD CT, at [facility] which showed positive free air in ABD and sent to ED by GYN.  Patient currently feels fine. States ABD pain has improved at this time and is now described as a mild discomfort. Denies vaginal discharge. Has not had intercourse with her hsuband since onset of pain. Tolerating PO. Denies vomiting, fever, chills.  GYN: Danica

## 2020-03-02 NOTE — ED ADULT NURSE NOTE - CHIEF COMPLAINT QUOTE
"I went to [facility] today and they called Dr. Mishra's office and spoke to their PA and she called me and told me to come to the ER b/c I have free air in my abdomen. " Pt arrives with disc from [facility] and report.  Pt A & OX4, denies n/v/d at this time but a week ago had diarrhea. Pt states had hysterectomy 11/12 and symptoms started after sex with , hx of lung ca

## 2020-03-03 LAB
CULTURE RESULTS: SIGNIFICANT CHANGE UP
SPECIMEN SOURCE: SIGNIFICANT CHANGE UP

## 2020-03-04 ENCOUNTER — APPOINTMENT (OUTPATIENT)
Dept: GYNECOLOGIC ONCOLOGY | Facility: CLINIC | Age: 55
End: 2020-03-04
Payer: COMMERCIAL

## 2020-03-04 VITALS
RESPIRATION RATE: 16 BRPM | WEIGHT: 158 LBS | OXYGEN SATURATION: 99 % | HEART RATE: 60 BPM | DIASTOLIC BLOOD PRESSURE: 80 MMHG | TEMPERATURE: 98.2 F | SYSTOLIC BLOOD PRESSURE: 126 MMHG | BODY MASS INDEX: 25.39 KG/M2 | HEIGHT: 66 IN

## 2020-03-04 DIAGNOSIS — R93.89 ABNORMAL FINDINGS ON DIAGNOSTIC IMAGING OF OTHER SPECIFIED BODY STRUCTURES: ICD-10-CM

## 2020-03-04 PROCEDURE — 99214 OFFICE O/P EST MOD 30 MIN: CPT

## 2020-03-10 ENCOUNTER — APPOINTMENT (OUTPATIENT)
Dept: OBGYN | Facility: CLINIC | Age: 55
End: 2020-03-10
Payer: COMMERCIAL

## 2020-03-10 VITALS
SYSTOLIC BLOOD PRESSURE: 104 MMHG | BODY MASS INDEX: 25.39 KG/M2 | DIASTOLIC BLOOD PRESSURE: 62 MMHG | HEIGHT: 66 IN | WEIGHT: 158 LBS

## 2020-03-10 DIAGNOSIS — Z85.118 PERSONAL HISTORY OF OTHER MALIGNANT NEOPLASM OF BRONCHUS AND LUNG: ICD-10-CM

## 2020-03-10 LAB
DATE COLLECTED: NORMAL
HEMOCCULT SP1 STL QL: NEGATIVE
QUALITY CONTROL: YES

## 2020-03-10 PROCEDURE — 99396 PREV VISIT EST AGE 40-64: CPT

## 2020-03-10 PROCEDURE — 82270 OCCULT BLOOD FECES: CPT

## 2020-03-10 NOTE — PHYSICAL EXAM
[Awake] : awake [Alert] : alert [Soft] : soft [Oriented x3] : oriented to person, place, and time [Normal] : vagina [Labia Majora] : labia major [Labia Minora] : labia minora [No Bleeding] : there was no active vaginal bleeding [No Tenderness] : no rectal tenderness [Acute Distress] : no acute distress [LAD] : no lymphadenopathy [Thyroid Nodule] : no thyroid nodule [Goiter] : no goiter [Mass] : no breast mass [Nipple Discharge] : no nipple discharge [Axillary LAD] : no axillary lymphadenopathy [Tender] : non tender [Distended] : not distended [H/Smegaly] : no hepatosplenomegaly [Depressed Mood] : not depressed [Flat Affect] : affect not flat [Absent] : absent [Tenderness] : nontender [Enlarged ___ wks] : not enlarged [Mass ___ cm] : no uterine mass was palpated [Adnexa Absent] : absent bilaterally [Adnexa Tenderness] : were not tender [Ovarian Mass (___ Cm)] : there were no adnexal masses [Occult Blood] : occult blood test from digital rectal exam was negative

## 2020-03-19 ENCOUNTER — RX RENEWAL (OUTPATIENT)
Age: 55
End: 2020-03-19

## 2020-03-23 PROBLEM — R93.89 THICKENED ENDOMETRIUM: Status: RESOLVED | Noted: 2019-03-06 | Resolved: 2020-03-23

## 2020-03-23 NOTE — REASON FOR VISIT
[FreeTextEntry1] : [facility]\par \par [facility] [phone] at [address] [place] [id number]\par

## 2020-03-23 NOTE — PHYSICAL EXAM
[Absent] : Adnexa(ae): Absent [Normal] : Bimanual Exam: Normal [de-identified] : vaginal cuff intact  [de-identified] : Kristin Culver MA was present the entire time of gynecological exam.

## 2020-03-23 NOTE — HISTORY OF PRESENT ILLNESS
[FreeTextEntry1] : 55 y/o with stage 1A grade 1 endometrial adenocarcinoma contained within the endometrium s/p RA TLH, BSO, bilateral pelvic and para-aortic sentinel lymph node dissection, cystoscopy on 11/12/19. Patient did not require any adjuvant therapy. Patient was recently seen by my PA on 02/03/2020. She admits to dyspareunia and pelvic pain after intercourse that resolves shortly after. She had a CT on 03/02/2020 that revealed a large amount of stool noted throughout the colon. There is no bowel obstruction or acute inflammatory changes. The appendix is normal. There is pneumoperitoneum essentially limited to the upper abdomen with the largest volume perihepatic in location, but no evidence of ascites or abscess. The source of the pneumoperitoneum is not identified. A small nodule in the left lateral abdomen appears to have been present on prior study without significant change. No adenopathy. There are severe degenerative changes of the right hip, progressed since 12/07/18 with new subchondral cysts and bone-on-bone appearance. Scoliosis and multilevel degenerative changes of the spine are also noted. \par \par Last time of intercourse was two weeks ago.

## 2020-03-23 NOTE — END OF VISIT
[FreeTextEntry3] : Written by Kristin Culver, acting as a scribe for Dr. Nino Mishra.\par This note accurately reflects the work and decisions made by me\par

## 2020-03-23 NOTE — ASSESSMENT
[FreeTextEntry1] : Discussed with patient that her pelvic exam is normal, her vaginal cuff is intact so sign of trauma or fistula. She is having normal bowel function, denies stool from vagina. She admits to lack of libido, which I am recommending she discuss further with her routine gynecologist. I am not in favor of estrogen feedback given her ER-KS receptors are focally positive.

## 2020-04-04 NOTE — OB HISTORY
[Total Preg ___] : : [unfilled] [Full Term ___] : [unfilled] (full-term) [Living ___] : [unfilled] (living) [Vaginal ___] : [unfilled] vaginal delivery(s) [AB Spont ___] : [unfilled] miscarriage(s) Skin normal color for race, warm, dry and intact. No evidence of rash.

## 2020-04-10 LAB
14-3-3 ETA AG SER IA-MCNC: <0.2 NG/ML
ALBUMIN MFR SERPL ELPH: 60.1 %
ALBUMIN SERPL ELPH-MCNC: 4.6 G/DL
ALBUMIN SERPL-MCNC: 4.4 G/DL
ALBUMIN/GLOB SERPL: 1.5 RATIO
ALP BLD-CCNC: 95 U/L
ALPHA1 GLOB MFR SERPL ELPH: 3.7 %
ALPHA1 GLOB SERPL ELPH-MCNC: 0.3 G/DL
ALPHA2 GLOB MFR SERPL ELPH: 9.3 %
ALPHA2 GLOB SERPL ELPH-MCNC: 0.7 G/DL
ALT SERPL-CCNC: 22 U/L
ANION GAP SERPL CALC-SCNC: 12 MMOL/L
AST SERPL-CCNC: 27 U/L
B-GLOBULIN MFR SERPL ELPH: 11.6 %
B-GLOBULIN SERPL ELPH-MCNC: 0.8 G/DL
BASOPHILS # BLD AUTO: 0.05 K/UL
BASOPHILS NFR BLD AUTO: 0.8 %
BILIRUB SERPL-MCNC: 0.3 MG/DL
BUN SERPL-MCNC: 21 MG/DL
CALCIUM SERPL-MCNC: 9.9 MG/DL
CD16+CD56+ CELLS # BLD: 179 /UL
CD16+CD56+ CELLS NFR BLD: 23 %
CD19 CELLS NFR BLD: 114 /UL
CD3 CELLS # BLD: 483 /UL
CD3 CELLS NFR BLD: 60 %
CD3+CD4+ CELLS # BLD: 311 /UL
CD3+CD4+ CELLS NFR BLD: 38 %
CD3+CD4+ CELLS/CD3+CD8+ CLL SPEC: 2.05 RATIO
CD3+CD8+ CELLS # SPEC: 152 /UL
CD3+CD8+ CELLS NFR BLD: 19 %
CELLS.CD3-CD19+/CELLS IN BLOOD: 14 %
CHLORIDE SERPL-SCNC: 101 MMOL/L
CO2 SERPL-SCNC: 29 MMOL/L
CREAT SERPL-MCNC: 1.03 MG/DL
CRP SERPL-MCNC: 0.13 MG/DL
DEPRECATED KAPPA LC FREE/LAMBDA SER: 1.42 RATIO
EOSINOPHIL # BLD AUTO: 0.1 K/UL
EOSINOPHIL NFR BLD AUTO: 1.7 %
ERYTHROCYTE [SEDIMENTATION RATE] IN BLOOD BY WESTERGREN METHOD: 29 MM/HR
GAMMA GLOB FLD ELPH-MCNC: 1.1 G/DL
GAMMA GLOB MFR SERPL ELPH: 15.3 %
GLUCOSE SERPL-MCNC: 74 MG/DL
HCT VFR BLD CALC: 42 %
HGB BLD-MCNC: 13.3 G/DL
IGA SER QL IEP: 217 MG/DL
IGG SER QL IEP: 1212 MG/DL
IGM SER QL IEP: 50 MG/DL
IMM GRANULOCYTES NFR BLD AUTO: 0.2 %
INTERPRETATION SERPL IEP-IMP: NORMAL
KAPPA LC CSF-MCNC: 1.66 MG/DL
KAPPA LC SERPL-MCNC: 2.36 MG/DL
LYMPHOCYTES # BLD AUTO: 0.81 K/UL
LYMPHOCYTES NFR BLD AUTO: 13.4 %
M PROTEIN SPEC IFE-MCNC: NORMAL
MAN DIFF?: NORMAL
MCHC RBC-ENTMCNC: 30.5 PG
MCHC RBC-ENTMCNC: 31.7 GM/DL
MCV RBC AUTO: 96.3 FL
MONOCYTES # BLD AUTO: 0.36 K/UL
MONOCYTES NFR BLD AUTO: 6 %
NEUTROPHILS # BLD AUTO: 4.7 K/UL
NEUTROPHILS NFR BLD AUTO: 77.9 %
PLATELET # BLD AUTO: 295 K/UL
POTASSIUM SERPL-SCNC: 4.4 MMOL/L
PROT SERPL-MCNC: 7.3 G/DL
RBC # BLD: 4.36 M/UL
RBC # FLD: 13 %
SODIUM SERPL-SCNC: 142 MMOL/L
WBC # FLD AUTO: 6.03 K/UL

## 2020-04-17 ENCOUNTER — APPOINTMENT (OUTPATIENT)
Dept: RHEUMATOLOGY | Facility: CLINIC | Age: 55
End: 2020-04-17
Payer: COMMERCIAL

## 2020-04-17 PROCEDURE — 99443: CPT

## 2020-04-17 RX ORDER — LORATADINE 10 MG
17 TABLET,DISINTEGRATING ORAL
Refills: 0 | Status: DISCONTINUED | COMMUNITY
Start: 2019-11-05 | End: 2020-04-17

## 2020-06-19 ENCOUNTER — APPOINTMENT (OUTPATIENT)
Dept: RHEUMATOLOGY | Facility: CLINIC | Age: 55
End: 2020-06-19

## 2020-06-22 ENCOUNTER — APPOINTMENT (OUTPATIENT)
Dept: GYNECOLOGIC ONCOLOGY | Facility: CLINIC | Age: 55
End: 2020-06-22
Payer: COMMERCIAL

## 2020-06-22 VITALS — WEIGHT: 152 LBS | BODY MASS INDEX: 24.43 KG/M2 | HEIGHT: 66 IN

## 2020-06-22 VITALS — OXYGEN SATURATION: 100 % | HEART RATE: 63 BPM | DIASTOLIC BLOOD PRESSURE: 70 MMHG | SYSTOLIC BLOOD PRESSURE: 107 MMHG

## 2020-06-22 PROCEDURE — 99214 OFFICE O/P EST MOD 30 MIN: CPT

## 2020-06-22 NOTE — ASSESSMENT
[FreeTextEntry1] : Clinically STEVE \par Pt is concerned about ct scan finding of left lateral abdominal nodule without significant change. Discussed with patient that I am not overly worried about this as it appeared stable on the report but that if she is worried then a follow up ctscan can be done.  Pt wished to have another ctscan to follow up on nodule.

## 2020-06-22 NOTE — PHYSICAL EXAM
[Anxious] : anxious [Abnormal] : Mood and affect: Abnormal [Absent] : Adnexa(ae): Absent [Normal] : Bimanual Exam: Normal [de-identified] : Kristin Guzman was present as chaperone during examination of patient.

## 2020-06-22 NOTE — HISTORY OF PRESENT ILLNESS
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 Also with history of lung cancer since 2019 s/p left upper lobe wedge resection, apicoposterior segmentectomy, mediastinal lymph node dissection on 8/27/19. Pt has chronic fatigue. Has had extensive blood work done recently with no abnormalities. She has major anxiety about having recurrence of her cancers. Admits to occasional dyspareunia and pelvic pain after intercourse and has low libido. Denies bowel or bladder issues. Denies VB. She follows with [facility] for her lung cancer. She is scheduled for right hip replacement in August. \par \par Ctscan 3/2/20-showed pneumperitoneum possibly from intercourse and small vaginal cuff dehiscence. Pt was evaluated at [facility] and monitored conservatively with no further intervention.  \par \par Bone density-Feb 2019-ordered by Dr. Angela \par Mammogram-Feb 2020-Bi Rad-2 -ordered by Dr. Allen \par Colonoscopy-June 2019-normal as per pt \par

## 2020-06-22 NOTE — REASON FOR VISIT
[FreeTextEntry1] : [facility]\par \par [facility] [phone] at [address] [place] [id number]\par

## 2020-08-03 ENCOUNTER — APPOINTMENT (OUTPATIENT)
Dept: RHEUMATOLOGY | Facility: CLINIC | Age: 55
End: 2020-08-03
Payer: COMMERCIAL

## 2020-08-03 VITALS
HEIGHT: 66 IN | WEIGHT: 152 LBS | OXYGEN SATURATION: 96 % | BODY MASS INDEX: 24.43 KG/M2 | HEART RATE: 68 BPM | TEMPERATURE: 98.8 F | DIASTOLIC BLOOD PRESSURE: 79 MMHG | SYSTOLIC BLOOD PRESSURE: 126 MMHG

## 2020-08-03 DIAGNOSIS — Z85.118 PERSONAL HISTORY OF OTHER MALIGNANT NEOPLASM OF BRONCHUS AND LUNG: ICD-10-CM

## 2020-08-03 DIAGNOSIS — Z85.42 PERSONAL HISTORY OF MALIGNANT NEOPLASM OF OTHER PARTS OF UTERUS: ICD-10-CM

## 2020-08-03 PROCEDURE — 99215 OFFICE O/P EST HI 40 MIN: CPT

## 2020-08-03 RX ORDER — TOBRAMYCIN 3 MG/ML
0.3 SOLUTION/ DROPS OPHTHALMIC
Refills: 0 | Status: DISCONTINUED | COMMUNITY
Start: 2019-11-05 | End: 2020-08-03

## 2020-08-03 RX ORDER — FAMOTIDINE 10 MG/1
TABLET, FILM COATED ORAL
Refills: 0 | Status: DISCONTINUED | COMMUNITY
End: 2020-08-03

## 2020-08-04 PROBLEM — Z85.118 HISTORY OF ADENOCARCINOMA OF LEFT LUNG: Status: RESOLVED | Noted: 2019-08-06 | Resolved: 2020-08-04

## 2020-08-04 PROBLEM — Z85.42 HISTORY OF ENDOMETRIAL CANCER: Status: RESOLVED | Noted: 2020-03-10 | Resolved: 2020-08-04

## 2020-08-04 NOTE — ASSESSMENT
[FreeTextEntry1] : \par 1. PsA, - inflammatory arthritis, psoriasis.  FH of IBD.  Unable to tolerate Otezla, taking NSAIDs chronically and steroids. No erosions. Off therapies, stable disease. Inflamm markers wnl.\par   on Humira  s/p psoriasis loading dose, has been on since 7/2018. Has not had good joint response, moderate psoriatic skin response. Reporting diffuse arthralgia and fatigue.\par   on prednisone 10mg/d chronically 7-9/2018, tapered off\par   Stelara x 1 dose - developed GI side effects, refrained from continued use\par   MRI L hip w/ active synovitis, effusion, and erosive arthropathy\par Consider IL12,23- however still risks, developing erosive disease in L hip now noted requiring tx, but in setting of recent active IVIS lung adenocarcinoma.\par Remains inactive, inflamm markers previously normal - off therapy for a few months\par 2. OA knee - likely more component of degenerative rather than inflammatory disease. S/p start of VS on R\par 3. 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  Overall appears her symptoms that may be more due to FM/OA rather than inflammatory process, labs for markers normal and no obvious synovitis on exam.\par 5. GERD, dyspepsia, nausea/vomiting - r/o gastric ulcer/gastritis.  GI symptoms not due to Stelara as not known adverse effect. No evidence of infectious gastroenteritis, etc.  Check H pylori and have GI f/u. C/w ranitidine and prn Reglan now.  EGD w/ gastritis, neg H pylori on bx.  Has pending capsule endoscopy. Symptoms seem most likely c/w GI illness, ?IBS since pt seemingly has more of a chronic pain component due to fibromyalgia\par 6. Chronic pain syndrome , FM - secondary, uncertain how long pt has had this disorder vs active PsA. She has some mild active Psoriasis, and labs mostly noninflammatory over last visits.  Has diffuse total body pain, poor nonrestorative sleep, increasing fatigue, depression/anxiety, ?IBS symptoms recently.  Will recommend treatment with nortriptyline vs duloxetine.  Unable to tolerate any po meds\par 7.  lung adenocarcinoma IVIS - differentiated lung adenocarcinoma at [facility] w/ IVIS wedge resection, apicoposterior segmentectomy, mediastinal LN dissection on 8/27/19. No evidence of metastasis on PET.\par Cannot use TNFi or other agents with increased malignancy w/ solid tumor/liquid hematologic malignancy-\par 8. Endometrial adenocarcinoma - focus on endometria/uterus, s/p TLH-BSO w/ resolution\par 9. OA hips - R>L worse on MRI imaging w/ erosive changes-?psoriatic vs erosive OA. Pending THR 9/2020. 1month ASA anticoag planned, will be off NSAIDs\par \par - c/w gabapentin 300mg QHS\par - c/w diclofenac 75mg BID.  Will need to hold prior to THR\par - c/w diclofenac gel topical 1% BID prn\par - still may need to consider biologic, however with lung ca and endometrial\par HCM: \par - Flu 2019/20 UTD, 2019 with pulm.  PPSV needed next visit.  PCV13 UTD 10/3018\par - Bone Health: DEXA UTD 2/2019, next 2/2021\par \par RTO 10/2020 (about 1 month after THR)

## 2020-08-04 NOTE — CONSULT LETTER
[Dear  ___] : Dear  [unfilled], [Courtesy Letter:] : I had the pleasure of seeing your patient, [unfilled], in my office today. [Please see my note below.] : Please see my note below. [Consult Closing:] : Thank you very much for allowing me to participate in the care of this patient.  If you have any questions, please do not hesitate to contact me. [Sincerely,] : Sincerely, [FreeTextEntry3] : Terry Angela II, MD\par \par Attending Rheumatologist\par Division of Rheumatology\par [facility]\par     [facility] &\par     Rheumatology at [place],\par     [facility]\par \par \par [facility]\par \par Contact:\par    [phone], fax [phone] ([place] office)\par    [phone], fax [phone] ([place] office)\par

## 2020-08-04 NOTE — PHYSICAL EXAM
[General Appearance - Alert] : alert [General Appearance - In No Acute Distress] : in no acute distress [Sclera] : the sclera and conjunctiva were normal [PERRL With Normal Accommodation] : pupils were equal in size, round, and reactive to light [Extraocular Movements] : extraocular movements were intact [Outer Ear] : the ears and nose were normal in appearance [Hearing Threshold Finger Rub Not [place]] : hearing was normal [Examination Of The Oral Cavity] : the lips and gums were normal [Nasal Cavity] : the nasal mucosa and septum were normal [Oropharynx] : the oropharynx was normal [Neck Appearance] : the appearance of the neck was normal [Neck Cervical Mass (___cm)] : no neck mass was observed [Jugular Venous Distention Increased] : there was no jugular-venous distention [Thyroid Diffuse Enlargement] : the thyroid was not enlarged [Thyroid Nodule] : there were no palpable thyroid nodules [Respiration, Rhythm And Depth] : normal respiratory rhythm and effort [Auscultation Breath Sounds / Voice Sounds] : lungs were clear to auscultation bilaterally [Heart Rate And Rhythm] : heart rate was normal and rhythm regular [Heart Sounds] : normal S1 and S2 [Heart Sounds Gallop] : no gallops [Murmurs] : no murmurs [Heart Sounds Pericardial Friction Rub] : no pericardial rub [Full Pulse] : the pedal pulses are present [Edema] : there was no peripheral edema [Bowel Sounds] : normal bowel sounds [Abdomen Soft] : soft [Abdomen Tenderness] : non-tender [Abdomen Mass (___ Cm)] : no abdominal mass palpated [No CVA Tenderness] : no ~M costovertebral angle tenderness [No Spinal Tenderness] : no spinal tenderness [Abnormal Walk] : normal gait [Musculoskeletal - Swelling] : no joint swelling seen [Nail Clubbing] : no clubbing  or cyanosis of the fingernails [Motor Tone] : muscle strength and tone were normal [Sensation] : the sensory exam was normal to light touch and pinprick [Deep Tendon Reflexes (DTR)] : deep tendon reflexes were 2+ and symmetric [Oriented To Time, Place, And Person] : oriented to person, place, and time [No Focal Deficits] : no focal deficits [Impaired Insight] : insight and judgment were intact [General Appearance - Well Nourished] : well nourished [General Appearance - Well Developed] : well developed [General Appearance - Well-Appearing] : healthy appearing [Skin Color & Pigmentation] : normal skin color and pigmentation [Skin Turgor] : normal skin turgor [] : no rash [Skin Lesions] : no skin lesions [Motor Exam] : the motor exam was normal [Affect] : the affect was normal [Mood] : the mood was normal [FreeTextEntry1] : \par Hands- OA changes in B/L hands with Heberden and Bel's nodes. \par Wrists- normal\par Elbows- normal\par Shoulders- normal\par Hips- Reduced external rotation bilaterally.  Tenderness on R hip with any ROM.\par Knees- Patellofemoral crepitus bilaterally without effusion. \par Ankles- normal\par Feet- normal\par MMT 10/10 proximally/distally bilaterally.

## 2020-08-04 NOTE — DATA REVIEWED
[FreeTextEntry1] : MRI L hip- degen changes w/ acetabular chondromalacia and multilocular subarticular cystic formation. Fraying of ant sup labru,, chronic tear of pos horn sup labrum. no synovitis or effusion.  chronic partial tear of gluteus medius insertional tendinosis.\par \par MRI R hip - extensive erosion component noted w/ reactive subarticular bone marrow edema, synovitis, effusion.  C/w seronegative arthropathy PsA.  Erosive OA also possibility however only minimal subarticular cystic changes in this joint vs the L hip.

## 2020-08-04 NOTE — HISTORY OF PRESENT ILLNESS
[___ Week(s) Ago] : [unfilled] week(s) ago [FreeTextEntry1] : - planned R THR 9/2020\par - s/p Euflexxa x 1 on R knee\par - planned ASA 81mg/d for 1 month s/p THR\par - would need refill diclofenac today\par - remains on gabapentin 300mg as well\par - remains taking escitalopram and Xanax prn.\par - ROS unchanged- has GI follow-up\par - doing well s/p lung nodule excision last year. S/p TREVON-BSO. Plans to see oncology Dr. Ahmet Golden [de-identified] : \par \par All other ROS negative except as mentioned in HPI.

## 2020-10-09 ENCOUNTER — APPOINTMENT (OUTPATIENT)
Dept: RHEUMATOLOGY | Facility: CLINIC | Age: 55
End: 2020-10-09
Payer: COMMERCIAL

## 2020-10-09 VITALS
HEART RATE: 69 BPM | DIASTOLIC BLOOD PRESSURE: 66 MMHG | HEIGHT: 66 IN | BODY MASS INDEX: 24.43 KG/M2 | OXYGEN SATURATION: 98 % | SYSTOLIC BLOOD PRESSURE: 106 MMHG | WEIGHT: 152 LBS | TEMPERATURE: 99.2 F

## 2020-10-09 DIAGNOSIS — M16.11 UNILATERAL PRIMARY OSTEOARTHRITIS, RIGHT HIP: ICD-10-CM

## 2020-10-09 DIAGNOSIS — F41.9 ANXIETY DISORDER, UNSPECIFIED: ICD-10-CM

## 2020-10-09 DIAGNOSIS — M19.079 PRIMARY OSTEOARTHRITIS, UNSPECIFIED ANKLE AND FOOT: ICD-10-CM

## 2020-10-09 PROCEDURE — 99215 OFFICE O/P EST HI 40 MIN: CPT

## 2020-10-30 PROBLEM — M19.079 1ST MTP ARTHRITIS: Status: RESOLVED | Noted: 2018-08-30 | Resolved: 2020-10-30

## 2020-10-30 PROBLEM — M16.11 PRIMARY OSTEOARTHRITIS OF RIGHT HIP: Status: RESOLVED | Noted: 2020-10-30 | Resolved: 2020-10-30

## 2020-12-14 ENCOUNTER — APPOINTMENT (OUTPATIENT)
Dept: GYNECOLOGIC ONCOLOGY | Facility: CLINIC | Age: 55
End: 2020-12-14
Payer: COMMERCIAL

## 2020-12-14 VITALS
RESPIRATION RATE: 16 BRPM | WEIGHT: 152 LBS | SYSTOLIC BLOOD PRESSURE: 109 MMHG | HEIGHT: 66 IN | BODY MASS INDEX: 24.43 KG/M2 | DIASTOLIC BLOOD PRESSURE: 73 MMHG | OXYGEN SATURATION: 97 % | HEART RATE: 69 BPM

## 2020-12-14 PROCEDURE — 99214 OFFICE O/P EST MOD 30 MIN: CPT

## 2020-12-14 PROCEDURE — 99072 ADDL SUPL MATRL&STAF TM PHE: CPT

## 2020-12-14 NOTE — HISTORY OF PRESENT ILLNESS
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 BSO, SNLD on 11/12/19. Also with history of lung cancer since 2019 s/p left upper lobe wedge resection, apicoposterior segmentectomy, mediastinal lymph node dissection on 8/27/19. Pt has chronic fatigue. Has had extensive blood work done recently with no abnormalities. She has major anxiety about having recurrence of her cancers and sees a psychiatrist.  Admits to occasional dyspareunia and pelvic pain after intercourse and has low libido. Denies bowel or bladder issues. Denies VB. She follows with [facility] and Dr. Golden at [facility] for her lung cancer. She underwent right hip replacement on 9/2/20 at [facility] and recovered well.  \par \par Ctscan 3/2/20-showed pneumperitoneum possibly from intercourse and small vaginal cuff dehiscence. Pt was evaluated at [facility] and monitored conservatively with no further intervention. \par \par Bone density-Feb 2019-ordered by Dr. Angela \par Mammogram-Feb 2020-Bi Rad-2 -ordered by Dr. Allen \par Colonoscopy-June 2019-normal as per pt \par \par  \par

## 2020-12-14 NOTE — PHYSICAL EXAM
[Absent] : Adnexa(ae): Absent [Normal] : Bimanual Exam: Normal [de-identified] : Argelia Adrian MA was present as chaperone during physical examination

## 2020-12-14 NOTE — REASON FOR VISIT
[FreeTextEntry1] : [facility]\par \par [facility] [phone] at [address] [place] [phone]\par

## 2021-01-22 ENCOUNTER — APPOINTMENT (OUTPATIENT)
Dept: RHEUMATOLOGY | Facility: CLINIC | Age: 56
End: 2021-01-22
Payer: COMMERCIAL

## 2021-01-22 VITALS
OXYGEN SATURATION: 96 % | DIASTOLIC BLOOD PRESSURE: 78 MMHG | TEMPERATURE: 99.1 F | SYSTOLIC BLOOD PRESSURE: 130 MMHG | HEIGHT: 66 IN | WEIGHT: 152 LBS | HEART RATE: 68 BPM | BODY MASS INDEX: 24.43 KG/M2

## 2021-01-22 PROCEDURE — 36415 COLL VENOUS BLD VENIPUNCTURE: CPT

## 2021-01-22 PROCEDURE — 99072 ADDL SUPL MATRL&STAF TM PHE: CPT

## 2021-01-22 PROCEDURE — 99215 OFFICE O/P EST HI 40 MIN: CPT | Mod: 25

## 2021-02-01 NOTE — PHYSICAL EXAM
[General Appearance - Alert] : alert [General Appearance - In No Acute Distress] : in no acute distress [General Appearance - Well Nourished] : well nourished [General Appearance - Well Developed] : well developed [General Appearance - Well-Appearing] : healthy appearing [Sclera] : the sclera and conjunctiva were normal [PERRL With Normal Accommodation] : pupils were equal in size, round, and reactive to light [Extraocular Movements] : extraocular movements were intact [Outer Ear] : the ears and nose were normal in appearance [Hearing Threshold Finger Rub Not [place]] : hearing was normal [Examination Of The Oral Cavity] : the lips and gums were normal [Nasal Cavity] : the nasal mucosa and septum were normal [Oropharynx] : the oropharynx was normal [Neck Appearance] : the appearance of the neck was normal [Neck Cervical Mass (___cm)] : no neck mass was observed [Jugular Venous Distention Increased] : there was no jugular-venous distention [Thyroid Diffuse Enlargement] : the thyroid was not enlarged [Thyroid Nodule] : there were no palpable thyroid nodules [Respiration, Rhythm And Depth] : normal respiratory rhythm and effort [Auscultation Breath Sounds / Voice Sounds] : lungs were clear to auscultation bilaterally [Heart Sounds] : normal S1 and S2 [Heart Rate And Rhythm] : heart rate was normal and rhythm regular [Heart Sounds Gallop] : no gallops [Murmurs] : no murmurs [Heart Sounds Pericardial Friction Rub] : no pericardial rub [Full Pulse] : the pedal pulses are present [Edema] : there was no peripheral edema [Bowel Sounds] : normal bowel sounds [Abdomen Soft] : soft [Abdomen Tenderness] : non-tender [Abdomen Mass (___ Cm)] : no abdominal mass palpated [No CVA Tenderness] : no ~M costovertebral angle tenderness [No Spinal Tenderness] : no spinal tenderness [Abnormal Walk] : normal gait [Nail Clubbing] : no clubbing  or cyanosis of the fingernails [Musculoskeletal - Swelling] : no joint swelling seen [Motor Tone] : muscle strength and tone were normal [FreeTextEntry1] : \par Hands- OA changes in B/L hands with Heberden and Bel's nodes. \par Wrists- normal\par Elbows- normal\par Shoulders- normal\par Hips- Reduced external rotation bilaterally.  Tenderness on R hip with any ROM.\par Knees- Patellofemoral crepitus bilaterally without effusion. \par Ankles- normal\par Feet- normal\par MMT 10/10 proximally/distally bilaterally.  [Skin Color & Pigmentation] : normal skin color and pigmentation [Skin Turgor] : normal skin turgor [] : no rash [Skin Lesions] : no skin lesions [Deep Tendon Reflexes (DTR)] : deep tendon reflexes were 2+ and symmetric [Sensation] : the sensory exam was normal to light touch and pinprick [Motor Exam] : the motor exam was normal [No Focal Deficits] : no focal deficits [Oriented To Time, Place, And Person] : oriented to person, place, and time [Impaired Insight] : insight and judgment were intact [Mood] : the mood was normal [Affect] : the affect was normal

## 2021-02-01 NOTE — HISTORY OF PRESENT ILLNESS
[___ Week(s) Ago] : [unfilled] week(s) ago [FreeTextEntry1] : - s/p R THR 9/2020- doing very well\par - s/p VS x 3 on R knee\par - tapering off ASA 81mg/d for 1 month s/p THR, discussed can restart diclofenac\par - remains on gabapentin 300mg as well, feels symptom relief. Remains taking escitalopram and Xanax prn.\par - ROS unchanged\par - doing well s/p lung nodule excision last year. S/p TREVON-BSO. Plans to see oncology Dr. Ahmet Golden. Stable symptoms. Reiterated avoiding biologic at this time 2/2 GYN and lung tumors/cancer. [de-identified] : \par \par All other ROS negative except as mentioned in HPI.

## 2021-02-01 NOTE — CONSULT LETTER
[Dear  ___] : Dear  [unfilled], [Courtesy Letter:] : I had the pleasure of seeing your patient, [unfilled], in my office today. [Please see my note below.] : Please see my note below. [Consult Closing:] : Thank you very much for allowing me to participate in the care of this patient.  If you have any questions, please do not hesitate to contact me. [Sincerely,] : Sincerely, [FreeTextEntry3] : Terry Angela II, MD\par \par Attending Rheumatologist\par Division of Rheumatology\par [facility] / [facility] [[email or web address]]\par     [facility] &\par     Rheumatology at [place],\par     [facility]\par \par \par [facility]\par \par Contact:\par    [phone], fax [phone] ([place] office)\par    [phone], fax [phone] ([place] office)\par

## 2021-02-01 NOTE — ASSESSMENT
[FreeTextEntry1] : \par 1. PsA, - inflammatory arthritis, psoriasis.  FH of IBD.  Unable to tolerate Otezla, taking NSAIDs chronically and steroids. No erosions. Off therapies, stable disease. Inflamm markers wnl.\par   on Humira  s/p psoriasis loading dose, has been on since 7/2018. Has not had good joint response, moderate psoriatic skin response. Reporting diffuse arthralgia and fatigue.\par   on prednisone 10mg/d chronically 7-9/2018, tapered off\par   Stelara x 1 dose - developed GI side effects, refrained from continued use\par   MRI L hip w/ active synovitis, effusion, and erosive arthropathy\par Consider IL12,23- however still risks, developing erosive disease in L hip now noted requiring tx, but in setting of recent active IVIS lung adenocarcinoma.\par Remains inactive, inflamm markers previously normal - off therapy for a few months\par 2. OA knee - likely more component of degenerative rather than inflammatory disease. S/p start of VS on R\par 3. 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  Overall appears her symptoms that may be more due to FM/OA rather than inflammatory process, labs for markers normal and no obvious synovitis on exam.\par 5. GERD, dyspepsia, nausea/vomiting - r/o gastric ulcer/gastritis.  GI symptoms not due to Stelara as not known adverse effect. No evidence of infectious gastroenteritis, etc.  Check H pylori and have GI f/u. C/w ranitidine and prn Reglan now.  EGD w/ gastritis, neg H pylori on bx.  Has pending capsule endoscopy. Symptoms seem most likely c/w GI illness, ?IBS since pt seemingly has more of a chronic pain component due to fibromyalgia\par 6. Chronic pain syndrome , FM - secondary, uncertain how long pt has had this disorder vs active PsA. She has some mild active Psoriasis, and labs mostly noninflammatory over last visits.  Has diffuse total body pain, poor nonrestorative sleep, increasing fatigue, depression/anxiety, ?IBS symptoms recently.  Will recommend treatment with nortriptyline vs duloxetine.  Unable to tolerate any po meds\par 7.  lung adenocarcinoma IVIS - differentiated lung adenocarcinoma at [facility] w/ IVIS wedge resection, apicoposterior segmentectomy, mediastinal LN dissection on 8/27/19. No evidence of metastasis on PET.\par Cannot use TNFi or other agents with increased malignancy w/ solid tumor/liquid hematologic malignancy-\par 8. Endometrial adenocarcinoma - focus on endometria/uterus, s/p TLH-BSO w/ resolution\par 9. OA hips - R>L worse on MRI imaging w/ erosive changes-?psoriatic vs erosive OA. Pending THR 9/2020. 1month ASA anticoag planned, will be off NSAIDs\par \par - c/w gabapentin 300mg QHS\par - diclofenac 75mg BID. \par - c/w diclofenac gel topical 1% BID prn\par - still may need to consider biologic, however with lung ca and endometrial. Will monitor for now\par HCM: \par - Flu 2019/20 UTD, 2019 with pulm.  PPSV needed next visit.  PCV13 UTD 10/3018\par - Bone Health: DEXA UTD 2/2019, next 2/2021\par \par RTO 3-4 months

## 2021-03-18 ENCOUNTER — APPOINTMENT (OUTPATIENT)
Dept: OBGYN | Facility: CLINIC | Age: 56
End: 2021-03-18
Payer: COMMERCIAL

## 2021-03-18 VITALS
BODY MASS INDEX: 24.59 KG/M2 | HEIGHT: 66 IN | TEMPERATURE: 97.3 F | WEIGHT: 153 LBS | SYSTOLIC BLOOD PRESSURE: 122 MMHG | DIASTOLIC BLOOD PRESSURE: 64 MMHG

## 2021-03-18 LAB
DATE COLLECTED: NORMAL
HEMOCCULT SP1 STL QL: NEGATIVE
QUALITY CONTROL: YES

## 2021-03-18 PROCEDURE — 99396 PREV VISIT EST AGE 40-64: CPT

## 2021-03-18 PROCEDURE — 99072 ADDL SUPL MATRL&STAF TM PHE: CPT

## 2021-03-18 PROCEDURE — 82270 OCCULT BLOOD FECES: CPT

## 2021-03-18 RX ORDER — DICLOFENAC SODIUM 10 MG/G
1 GEL TOPICAL
Qty: 3 | Refills: 2 | Status: DISCONTINUED | COMMUNITY
Start: 2019-09-27 | End: 2021-03-18

## 2021-03-18 RX ORDER — DOXEPIN HYDROCHLORIDE 10 MG/ML
10 SOLUTION ORAL
Refills: 0 | Status: DISCONTINUED | COMMUNITY
Start: 2019-05-03 | End: 2021-03-18

## 2021-03-18 NOTE — PHYSICAL EXAM
[Awake] : awake [Alert] : alert [Soft] : soft [Oriented x3] : oriented to person, place, and time [Normal] : vagina [Labia Majora] : labia major [Labia Minora] : labia minora [No Bleeding] : there was no active vaginal bleeding [Absent] : absent [Adnexa Absent] : absent bilaterally [No Tenderness] : no rectal tenderness [Acute Distress] : no acute distress [LAD] : no lymphadenopathy [Thyroid Nodule] : no thyroid nodule [Goiter] : no goiter [Mass] : no breast mass [Nipple Discharge] : no nipple discharge [Axillary LAD] : no axillary lymphadenopathy [Tender] : non tender [Distended] : not distended [H/Smegaly] : no hepatosplenomegaly [Depressed Mood] : not depressed [Flat Affect] : affect not flat [Tenderness] : nontender [Enlarged ___ wks] : not enlarged [Mass ___ cm] : no uterine mass was palpated [Adnexa Tenderness] : were not tender [Ovarian Mass (___ Cm)] : there were no adnexal masses [Occult Blood] : occult blood test from digital rectal exam was negative

## 2021-03-24 LAB
CYTOLOGY CVX/VAG DOC THIN PREP: ABNORMAL
HPV HIGH+LOW RISK DNA PNL CVX: NOT DETECTED

## 2021-04-16 ENCOUNTER — APPOINTMENT (OUTPATIENT)
Dept: RHEUMATOLOGY | Facility: CLINIC | Age: 56
End: 2021-04-16
Payer: COMMERCIAL

## 2021-04-16 VITALS
OXYGEN SATURATION: 95 % | BODY MASS INDEX: 24.75 KG/M2 | SYSTOLIC BLOOD PRESSURE: 131 MMHG | HEART RATE: 66 BPM | HEIGHT: 66 IN | TEMPERATURE: 99.4 F | DIASTOLIC BLOOD PRESSURE: 84 MMHG | WEIGHT: 154 LBS

## 2021-04-16 PROCEDURE — 99072 ADDL SUPL MATRL&STAF TM PHE: CPT

## 2021-04-16 PROCEDURE — 99214 OFFICE O/P EST MOD 30 MIN: CPT

## 2021-05-18 ENCOUNTER — NON-APPOINTMENT (OUTPATIENT)
Age: 56
End: 2021-05-18

## 2021-06-07 ENCOUNTER — APPOINTMENT (OUTPATIENT)
Dept: GYNECOLOGIC ONCOLOGY | Facility: CLINIC | Age: 56
End: 2021-06-07
Payer: COMMERCIAL

## 2021-06-07 VITALS — SYSTOLIC BLOOD PRESSURE: 123 MMHG | HEART RATE: 62 BPM | OXYGEN SATURATION: 97 % | DIASTOLIC BLOOD PRESSURE: 80 MMHG

## 2021-06-07 PROCEDURE — 99072 ADDL SUPL MATRL&STAF TM PHE: CPT

## 2021-06-07 PROCEDURE — 99213 OFFICE O/P EST LOW 20 MIN: CPT

## 2021-06-07 NOTE — PHYSICAL EXAM
[Abnormal] : Mood and affect: Abnormal [Flat] : flat [Absent] : Adnexa(ae): Absent [Normal] : Bimanual Exam: Normal [de-identified] : Jazlyn Neal was present as chaperone during the entire physical examination.

## 2021-06-07 NOTE — HISTORY OF PRESENT ILLNESS
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 Also with history of lung cancer since 2019 s/p left upper lobe wedge resection, apicoposterior segmentectomy, mediastinal lymph node dissection on 8/27/19. She has major anxiety about having recurrence of her cancers and sees a psychiatrist. Pt is sexually active but complains of very low libido. She was offered testosterone by her primary gyn but pt prefers not to take meds or hormones. Denies bowel or bladder issues. Denies VB. She follows with [facility] and Dr. Golden at [facility] for her lung cancer. She underwent right hip replacement on 9/2/20 at [facility] and recovered well. \par \par Ctscan 3/2/20-showed pneumoperitoneum possibly from intercourse and small vaginal cuff dehiscence. Pt was evaluated at [facility] and monitored conservatively with no further intervention. \par \par Pet ct -3/5/21 from [facility]-new symetric bilateral hilar and mediastinal FDG avid adenopathy, favored reactive/inflammatory etiology (sarcoid type reaction) and less likely malignancy-short term follow up recommended. Reports having a lung biopsy with [facility] which was wnl. \par \par Pap smear-3/18/21-atrophic vaginitis \par Bone density-May 2021-osteopenia  \par Mammogram-May 2021-Bi Rad-2 -ordered by Dr. Allen \par Colonoscopy-June 2019-normal as per pt \par \par  \par \par

## 2021-06-07 NOTE — REASON FOR VISIT
[FreeTextEntry1] : [facility]\par \par [facility] [phone] at [address] [place] [phone]\par

## 2021-07-16 ENCOUNTER — APPOINTMENT (OUTPATIENT)
Dept: RHEUMATOLOGY | Facility: CLINIC | Age: 56
End: 2021-07-16
Payer: COMMERCIAL

## 2021-07-16 VITALS
HEART RATE: 62 BPM | WEIGHT: 154 LBS | SYSTOLIC BLOOD PRESSURE: 112 MMHG | OXYGEN SATURATION: 98 % | BODY MASS INDEX: 24.75 KG/M2 | DIASTOLIC BLOOD PRESSURE: 74 MMHG | HEIGHT: 66 IN

## 2021-07-16 PROCEDURE — 99072 ADDL SUPL MATRL&STAF TM PHE: CPT

## 2021-07-16 PROCEDURE — 99214 OFFICE O/P EST MOD 30 MIN: CPT

## 2021-11-05 ENCOUNTER — APPOINTMENT (OUTPATIENT)
Dept: RHEUMATOLOGY | Facility: CLINIC | Age: 56
End: 2021-11-05
Payer: COMMERCIAL

## 2021-11-05 VITALS
SYSTOLIC BLOOD PRESSURE: 115 MMHG | HEIGHT: 66 IN | RESPIRATION RATE: 18 BRPM | BODY MASS INDEX: 24.75 KG/M2 | HEART RATE: 65 BPM | DIASTOLIC BLOOD PRESSURE: 76 MMHG | OXYGEN SATURATION: 99 % | WEIGHT: 154 LBS

## 2021-11-05 PROCEDURE — 99214 OFFICE O/P EST MOD 30 MIN: CPT | Mod: 25

## 2021-11-05 PROCEDURE — 99072 ADDL SUPL MATRL&STAF TM PHE: CPT

## 2021-11-05 PROCEDURE — 36415 COLL VENOUS BLD VENIPUNCTURE: CPT

## 2021-11-05 RX ORDER — CELECOXIB 200 MG/1
200 CAPSULE ORAL
Refills: 0 | Status: DISCONTINUED | COMMUNITY
End: 2021-11-05

## 2021-11-05 RX ORDER — HYALURONATE SODIUM 20 MG/2 ML
20 SYRINGE (ML) INTRAARTICULAR
Qty: 6 | Refills: 0 | Status: DISCONTINUED | COMMUNITY
Start: 2020-07-13 | End: 2021-11-05

## 2021-11-05 RX ORDER — DICLOFENAC SODIUM 1% 10 MG/G
1 GEL TOPICAL
Refills: 0 | Status: DISCONTINUED | COMMUNITY
End: 2021-11-05

## 2021-11-05 RX ORDER — CLOBETASOL PROPIONATE 0.5 MG/G
0.05 OINTMENT TOPICAL
Refills: 0 | Status: DISCONTINUED | COMMUNITY
End: 2021-11-05

## 2021-12-06 ENCOUNTER — APPOINTMENT (OUTPATIENT)
Dept: GYNECOLOGIC ONCOLOGY | Facility: CLINIC | Age: 56
End: 2021-12-06
Payer: COMMERCIAL

## 2021-12-06 VITALS
WEIGHT: 156 LBS | OXYGEN SATURATION: 97 % | SYSTOLIC BLOOD PRESSURE: 117 MMHG | RESPIRATION RATE: 16 BRPM | BODY MASS INDEX: 25.07 KG/M2 | DIASTOLIC BLOOD PRESSURE: 75 MMHG | HEIGHT: 66 IN | HEART RATE: 63 BPM

## 2021-12-06 PROCEDURE — 99213 OFFICE O/P EST LOW 20 MIN: CPT

## 2021-12-06 PROCEDURE — 99072 ADDL SUPL MATRL&STAF TM PHE: CPT

## 2021-12-06 NOTE — HISTORY OF PRESENT ILLNESS
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 Also with history of lung cancer since 2019 s/p left upper lobe wedge resection, apicoposterior segmentectomy, mediastinal lymph node dissection on 8/27/19. She has major anxiety about having recurrence of her cancers and sees a psychiatrist. Pt is sexually active but complains of very low libido. She was offered testosterone by her primary gyn but pt prefers not to take meds or hormones. Denies bowel or bladder issues. Denies VB. Reports always feeling tired. She follows with [facility] and Dr. Golden at [facility] for her lung cancer. She underwent right hip replacement on 9/2/20 at [facility] and recovered well. \par \par Since last visit, pt had Pfizer booster week of Thanksgiving. \par \par Ctscan 3/2/20-showed pneumoperitoneum possibly from intercourse and small vaginal cuff dehiscence. Pt was evaluated at [facility] and monitored conservatively with no further intervention. \par \par Pet ct -3/5/21 from [facility]-new symetric bilateral hilar and mediastinal FDG avid adenopathy, favored reactive/inflammatory etiology (sarcoid type reaction) and less likely malignancy-short term follow up recommended. Reports having a lung biopsy with [facility] which was wnl. \par \par Pap smear-3/18/21-atrophic vaginitis \par Bone density-May 2021-osteopenia \par Mammogram-May 2021-Bi Rad-2 -ordered by Dr. Allen \par Colonoscopy-June 2019-normal as per pt \par

## 2021-12-06 NOTE — PHYSICAL EXAM
[Chaperone Present] : A chaperone was present in the examining room during all aspects of the physical examination [Absent] : Adnexa(ae): Absent [Abnormal] : Palpation of lymph nodes in groin: Abnormal [Normal] : Skin and subcutaneous tissue: Normal without rashes or lesions [FreeTextEntry1] : Breann Farr MA [de-identified] : right groin with mobile smooth mass approx 1cm

## 2021-12-06 NOTE — REASON FOR VISIT
[FreeTextEntry1] : [facility]\par \par [facility] [phone] at [address] [place] [phone]\par

## 2021-12-06 NOTE — ASSESSMENT
[FreeTextEntry1] : I discussed with the patient that she is now approximately two years from her gynecological cancer diagnosis.  I explained the rationale for extending the interval between her examinations to every six months.  The signs and symptoms of recurrence were reviewed and the patient knows to return to see me sooner if she has any of these or any other concerns prior to her next visit.  Reasonable expectations for cure were also discussed.\par \par Pt with palpable, mildly tender right inguinal mass/LN on exam today. Will order Ct scan abd/pelvis for further evaluation. Pt also had recent Pfizer booster a few weeks ago. \par \par

## 2021-12-22 ENCOUNTER — APPOINTMENT (OUTPATIENT)
Dept: GYNECOLOGIC ONCOLOGY | Facility: CLINIC | Age: 56
End: 2021-12-22
Payer: COMMERCIAL

## 2021-12-22 PROCEDURE — 99213 OFFICE O/P EST LOW 20 MIN: CPT | Mod: 25

## 2021-12-22 PROCEDURE — 99072 ADDL SUPL MATRL&STAF TM PHE: CPT

## 2021-12-22 PROCEDURE — 76857 US EXAM PELVIC LIMITED: CPT | Mod: 59

## 2022-01-03 NOTE — HISTORY OF PRESENT ILLNESS
[FreeTextEntry1] : This 57 y/o with a hx of Stage 1A grade 1 endometrial adenocarcinoma within endometrium s/p RA [facility] BSO, SLND on 11/12/21. Patient also with a hx of lung cancer since 2019 s/p left upper lobe wedge resection, apicoposterior segmentectomy, medialstinal lymph node dissection on 8/27/2019. Patient was recently seen by GINNA Trujillo for her 2 year visit. Patient had expressed major anxiety about having recurrence of her cancers and sees a psychiatrist. On exam patient had a palpable, mildly tender right inguinal mass/LN. A CT of the abd/pelvic was ordered for further evaluation. Patient had reported having recent Pfizer booster a few weeks ago. Patient returns to the office today for an exam and to review CT results. \par \par CT abdomen and pelvis 12/8/21 revealed no evidence of recurrent tumor or metastatic disease. \par \par Patients  was informed by Cassandra that his wife CT was overall normal but that she was not sure if they were able to see her right groin area.

## 2022-01-03 NOTE — ASSESSMENT
[FreeTextEntry1] : Ultrasound in office today wnl. Node found on exam by my OLLIE Trujillo not of concern. Groin inspected while standing, no evidence of hernia. Most likely musculoskeletal. CT normal. Patient to return in 1 month for reevaluation.

## 2022-01-03 NOTE — END OF VISIT
[FreeTextEntry3] : Written by Breann Ríos, acting as a scribe for Dr. Nino Mishra \rob This note accurately reflects the work and decisions made by me.

## 2022-01-03 NOTE — REASON FOR VISIT
[FreeTextEntry1] : [place] [facility] \par \par [facility] [place]. [phone]\par [address] [place] [address]

## 2022-01-03 NOTE — PHYSICAL EXAM
[Chaperone Present] : A chaperone was present in the examining room during all aspects of the physical examination [Normal] : Mood and affect: Normal [FreeTextEntry1] : Breann Ríos  Medical assistant chaperoned during gynecologic exam.

## 2022-01-20 ENCOUNTER — APPOINTMENT (OUTPATIENT)
Dept: GYNECOLOGIC ONCOLOGY | Facility: CLINIC | Age: 57
End: 2022-01-20
Payer: COMMERCIAL

## 2022-02-02 ENCOUNTER — APPOINTMENT (OUTPATIENT)
Dept: GYNECOLOGIC ONCOLOGY | Facility: CLINIC | Age: 57
End: 2022-02-02
Payer: COMMERCIAL

## 2022-02-02 VITALS
HEIGHT: 66 IN | SYSTOLIC BLOOD PRESSURE: 124 MMHG | WEIGHT: 156 LBS | BODY MASS INDEX: 25.07 KG/M2 | RESPIRATION RATE: 16 BRPM | OXYGEN SATURATION: 97 % | HEART RATE: 70 BPM | DIASTOLIC BLOOD PRESSURE: 79 MMHG

## 2022-02-02 PROCEDURE — 99072 ADDL SUPL MATRL&STAF TM PHE: CPT

## 2022-02-02 PROCEDURE — 99213 OFFICE O/P EST LOW 20 MIN: CPT

## 2022-02-02 RX ORDER — ACYCLOVIR 800 MG/1
800 TABLET ORAL
Qty: 90 | Refills: 3 | Status: DISCONTINUED | COMMUNITY
Start: 2019-03-06 | End: 2022-02-02

## 2022-02-07 ENCOUNTER — APPOINTMENT (OUTPATIENT)
Dept: RHEUMATOLOGY | Facility: CLINIC | Age: 57
End: 2022-02-07
Payer: COMMERCIAL

## 2022-02-07 VITALS
OXYGEN SATURATION: 97 % | DIASTOLIC BLOOD PRESSURE: 87 MMHG | BODY MASS INDEX: 25.07 KG/M2 | HEART RATE: 64 BPM | WEIGHT: 156 LBS | HEIGHT: 66 IN | RESPIRATION RATE: 18 BRPM | SYSTOLIC BLOOD PRESSURE: 146 MMHG

## 2022-02-07 DIAGNOSIS — G89.29 PAIN IN RIGHT TOE(S): ICD-10-CM

## 2022-02-07 DIAGNOSIS — Z87.2 PERSONAL HISTORY OF DISEASES OF THE SKIN AND SUBCUTANEOUS TISSUE: ICD-10-CM

## 2022-02-07 DIAGNOSIS — G47.9 SLEEP DISORDER, UNSPECIFIED: ICD-10-CM

## 2022-02-07 DIAGNOSIS — R19.09 OTHER INTRA-ABDOMINAL AND PELVIC SWELLING, MASS AND LUMP: ICD-10-CM

## 2022-02-07 DIAGNOSIS — M85.80 OTHER SPECIFIED DISORDERS OF BONE DENSITY AND STRUCTURE, UNSPECIFIED SITE: ICD-10-CM

## 2022-02-07 DIAGNOSIS — M79.674 PAIN IN RIGHT TOE(S): ICD-10-CM

## 2022-02-07 PROCEDURE — 99072 ADDL SUPL MATRL&STAF TM PHE: CPT

## 2022-02-07 PROCEDURE — 36415 COLL VENOUS BLD VENIPUNCTURE: CPT

## 2022-02-07 PROCEDURE — 99214 OFFICE O/P EST MOD 30 MIN: CPT | Mod: 25

## 2022-02-14 NOTE — ASSESSMENT
[FreeTextEntry1] : Discussed with patient that on her exam today I felt a swollen lymph node on her right side. I am recommending patient have a US of the groin and follow up in 2 weeks to review results. Patient understands that if her ultrasound is reassuring we will continue to monitor lymph node. Patient stated she understood and agreed to comply.

## 2022-02-14 NOTE — END OF VISIT
[FreeTextEntry3] : Written by Coleen Ogden, acting as a scribe for Dr. Nino Mishra \par This note accurately reflects the work and decisions made by me.

## 2022-02-14 NOTE — REASON FOR VISIT
[FreeTextEntry1] : [place] [facility] \par \par [facility] [place]. [phone]\par [address] [facility], [place] [address] \par \par -Hx of Stage IA grade 1 endometrial adenocarcinoma within endometrium. \par -S/p  RA [facility] BSO, SLND on 11/12/2019. \par -Pt also w/ hx of lung cancer s/p left upper lobe wedge resection, apicoposterior segmentectomy, medialstinal lymph node dissection on 8/27/2019.\par -Followed by P.A palpable, mildly tender right inguinal mass on exam w/ P.A 12/6/21. \par -CT 12/8/21 STEVE\par -Exam by me 12/22/21 node not of concern. Likely musculoskeletal US wnl. \par -1 month f/u \par

## 2022-02-14 NOTE — HISTORY OF PRESENT ILLNESS
[FreeTextEntry1] : This 55 y/o w/ a hx of Stage 1A grade 1 endometrial adenocarcinoma within endometrium s/p RA TLH BSO, SLND on 11/12/19. Patient also with a hx of lung cancer since 2019 s/p left upper lobe wedge resection, apicoposterior segmentectomy, medialstinal lymph node dissection on 8/27/2019. Patient was seen by GINNA Trujillo for her 2 year visit on 12/6/21. Patient had expressed major anxiety about having recurrence of her cancers and sees a psychiatrist. On exam patient had a palpable, mildly tender right inguinal mass/LN. A CT of the abd/pelvic was ordered for further evaluation. Patient had reported having recent Pfizer booster a few weeks ago. \par \par CT abdomen and pelvis 12/8/21 revealed no evidence of recurrent tumor or metastatic disease. \par \par Patients  was informed by Cassandra that his wife CT was overall normal but that she was not sure if they were able to see her right groin area. \par \par Ultrasound in office 12/22/21 wnl. Node found on exam by my OLLIE Trujillo not of concern. Groin inspected while standing, no evidence of hernia. Most likely musculoskeletal. CT normal. Patient advised to return in 1 month for reevaluation.\par

## 2022-02-14 NOTE — PHYSICAL EXAM
[Abnormal] : Palpation of lymph nodes in groin: Abnormal [Normal] : Mood and affect: Normal [FreeTextEntry1] : Coleen Ogden Medical assistant chaperoned during exam.  [de-identified] : right enlarged swollen lymph node measuring about the size of a grape.

## 2022-02-25 ENCOUNTER — APPOINTMENT (OUTPATIENT)
Dept: GYNECOLOGIC ONCOLOGY | Facility: CLINIC | Age: 57
End: 2022-02-25
Payer: COMMERCIAL

## 2022-02-25 VITALS
WEIGHT: 156 LBS | OXYGEN SATURATION: 98 % | DIASTOLIC BLOOD PRESSURE: 76 MMHG | BODY MASS INDEX: 25.07 KG/M2 | SYSTOLIC BLOOD PRESSURE: 122 MMHG | HEIGHT: 66 IN | HEART RATE: 59 BPM | RESPIRATION RATE: 16 BRPM

## 2022-02-25 PROCEDURE — 99212 OFFICE O/P EST SF 10 MIN: CPT

## 2022-02-25 PROCEDURE — 99072 ADDL SUPL MATRL&STAF TM PHE: CPT

## 2022-03-01 NOTE — REASON FOR VISIT
[FreeTextEntry1] : [place] Location \par \par [facility] [place]. [phone]\par [address], [place] [address] \par \par -Hx of Stage IA grade 1 endometrial adenocarcinoma within endometrium. \par -S/p RA TLH BSO, SLND on 11/12/2019. \par -Pt also w/ hx of lung cancer s/p left upper lobe wedge resection, apicoposterior segmentectomy, medialstinal lymph node dissection on 8/27/2019.\par -Followed by P.A palpable, mildly tender right inguinal mass on exam w/ P.A 12/6/21. \par -CT 12/8/21 STEVE\par -Exam by me 12/22/21 node not of concern. Likely musculoskeletal US wnl. \par -F/u exam 2/2/22 felt swollen lymph node on right side\par -US of groin recommended\par -F/u to review results.

## 2022-03-01 NOTE — HISTORY OF PRESENT ILLNESS
[FreeTextEntry1] : This 55 y/o w/ a hx of Stage 1A grade 1 endometrial adenocarcinoma within endometrium s/p RA TLH BSO, SLND on 11/12/19. Patient also with a hx of lung cancer since 2019 s/p left upper lobe wedge resection, apicoposterior segmentectomy, medialstinal lymph node dissection on 8/27/2019. Patient was seen by GINNA Trujillo for her 2 year visit on 12/6/21. Patient had expressed major anxiety about having recurrence of her cancers and sees a psychiatrist. On exam patient had a palpable, mildly tender right inguinal mass/LN. A CT of the abd/pelvic was ordered for further evaluation. Patient had reported having recent Pfizer booster. \par \par CT abdomen and pelvis 12/8/21 revealed no evidence of recurrent tumor or metastatic disease. \par \par Patients  was informed by Cassandra that his wife CT was overall normal but that she was not sure if they were able to see her right groin area. \par \par Ultrasound in office 12/22/21 wnl. Node found on exam by my OLLIE Trujillo not of concern. Groin inspected while standing, no evidence of hernia. Most likely musculoskeletal. CT normal. Patient advised to return in 1 month for reevaluation. Patient had a follow up exam with me on 2/2/22 which I felt a swollen lymph node on her right side. I recommended patient have an US of her groin and follow up for reuslts. Patient aware if US is reassuring we will continue to monitor lymph node \par \par Groin US performed on 2/10/22 revealed likely normal or reactive lymph node. \par \par

## 2022-03-01 NOTE — ASSESSMENT
[FreeTextEntry1] : I discussed with patient that her ultrasound was wnl. I am recommending patient resume visit with my P.A in 6 months. Patient stated she understood and agreed to comply.

## 2022-03-01 NOTE — PHYSICAL EXAM
[Normal] : no adenopathy noted in inguinal lymph nodes [FreeTextEntry1] : Coleen Ogden Medical assistant was present during entire visit.  [de-identified] : 1 cm swollen right sided lymph node

## 2022-03-21 ENCOUNTER — APPOINTMENT (OUTPATIENT)
Dept: OBGYN | Facility: CLINIC | Age: 57
End: 2022-03-21
Payer: COMMERCIAL

## 2022-03-21 VITALS
SYSTOLIC BLOOD PRESSURE: 122 MMHG | HEIGHT: 66 IN | DIASTOLIC BLOOD PRESSURE: 74 MMHG | WEIGHT: 155 LBS | BODY MASS INDEX: 24.91 KG/M2

## 2022-03-21 DIAGNOSIS — Z12.11 ENCOUNTER FOR SCREENING FOR MALIGNANT NEOPLASM OF COLON: ICD-10-CM

## 2022-03-21 DIAGNOSIS — R93.89 ABNORMAL FINDINGS ON DIAGNOSTIC IMAGING OF OTHER SPECIFIED BODY STRUCTURES: ICD-10-CM

## 2022-03-21 DIAGNOSIS — Z98.890 OTHER SPECIFIED POSTPROCEDURAL STATES: ICD-10-CM

## 2022-03-21 DIAGNOSIS — Z92.29 PERSONAL HISTORY OF OTHER DRUG THERAPY: ICD-10-CM

## 2022-03-21 DIAGNOSIS — N88.2 STRICTURE AND STENOSIS OF CERVIX UTERI: ICD-10-CM

## 2022-03-21 DIAGNOSIS — N85.02 ENDOMETRIAL INTRAEPITHELIAL NEOPLASIA [EIN]: ICD-10-CM

## 2022-03-21 DIAGNOSIS — Z12.31 ENCOUNTER FOR SCREENING MAMMOGRAM FOR MALIGNANT NEOPLASM OF BREAST: ICD-10-CM

## 2022-03-21 PROCEDURE — 82270 OCCULT BLOOD FECES: CPT

## 2022-03-21 PROCEDURE — 99396 PREV VISIT EST AGE 40-64: CPT

## 2022-03-21 RX ORDER — PANTOPRAZOLE 40 MG/1
40 TABLET, DELAYED RELEASE ORAL
Refills: 0 | Status: DISCONTINUED | COMMUNITY
End: 2022-03-21

## 2022-03-21 RX ORDER — BENZOCAINE/BENZALKONIUM/ALOE/E 5 %-0.13 %
10 CREAM (GRAM) TOPICAL
Refills: 0 | Status: ACTIVE | COMMUNITY

## 2022-03-22 NOTE — END OF VISIT
[FreeTextEntry3] : I, Imani Walters, solely acted as a scribe for Dr. Phyllis Saul on 03/21/2022 . All medical entries made by the Scribe were at my, Dr. Saul's, direction and personally dictated by me on 03/21/2022. I have reviewed the chart and agree that the record accurately reflects my personal performance of the history, physical exam, assessment and plan. I have also personally directed, reviewed and agreed with the chart.

## 2022-03-22 NOTE — HISTORY OF PRESENT ILLNESS
[postmenopausal] : postmenopausal [N] : Patient does not use contraception [Y] : Positive pregnancy history [Menarche Age: ____] : age at menarche was [unfilled] [No] : Patient does not have concerns regarding sex [Mammogramdate] : 05/03/21 [TextBox_19] : BR2 [PapSmeardate] : 03/18/21 [TextBox_31] : NEG [BoneDensityDate] : 05/03/21 [TextBox_37] : OSTEOPENIA [HPVDate] : 03/18/21 [TextBox_78] : NEG [LMPDate] : 2016 [PGHxTotal] : 3 [[facility] Center[facility] for IncurableslTerm] : 3 [[facility] Centeriving] : 3 [FreeTextEntry1] : 2016

## 2022-03-22 NOTE — PHYSICAL EXAM
[Chaperone Present] : A chaperone was present in the examining room during all aspects of the physical examination [Appropriately responsive] : appropriately responsive [Alert] : alert [No Acute Distress] : no acute distress [Soft] : soft [Non-tender] : non-tender [Non-distended] : non-distended [Oriented x3] : oriented x3 [Examination Of The Breasts] : a normal appearance [No Discharge] : no discharge [No Masses] : no breast masses were palpable [Labia Majora] : normal [Labia Minora] : normal [Normal] : normal [No Bleeding] : There was no active vaginal bleeding [Absent] : absent [Uterine Adnexae] : absent [No Tenderness] : no tenderness [Nl Sphincter Tone] : normal sphincter tone [FreeTextEntry1] : ENRIQUE Lim [Atrophy] : atrophy [Dry Mucosa] : dry mucosa [FreeTextEntry4] : Normal appearing cuff [FreeTextEntry9] : Guaiac negative

## 2022-03-23 LAB
DATE COLLECTED: NORMAL
HEMOCCULT SP1 STL QL: NEGATIVE
HPV HIGH+LOW RISK DNA PNL CVX: NOT DETECTED
QUALITY CONTROL: YES

## 2022-03-26 LAB — CYTOLOGY CVX/VAG DOC THIN PREP: ABNORMAL

## 2022-03-28 LAB
14-3-3 ETA AG SER IA-MCNC: <0.2 NG/ML
A-TUMOR NECROSIS FACT SERPL-MCNC: 5.5 PG/ML
ALBUMIN SERPL ELPH-MCNC: 4.3 G/DL
ALBUMIN SERPL ELPH-MCNC: 4.6 G/DL
ALP BLD-CCNC: 78 U/L
ALP BLD-CCNC: 79 U/L
ALT SERPL-CCNC: 23 U/L
ALT SERPL-CCNC: 31 U/L
ANION GAP SERPL CALC-SCNC: 11 MMOL/L
ANION GAP SERPL CALC-SCNC: 13 MMOL/L
AST SERPL-CCNC: 25 U/L
AST SERPL-CCNC: 31 U/L
BASOPHILS # BLD AUTO: 0.04 K/UL
BASOPHILS # BLD AUTO: 0.05 K/UL
BASOPHILS NFR BLD AUTO: 0.6 %
BASOPHILS NFR BLD AUTO: 0.7 %
BILIRUB SERPL-MCNC: 0.2 MG/DL
BILIRUB SERPL-MCNC: 0.3 MG/DL
BUN SERPL-MCNC: 19 MG/DL
BUN SERPL-MCNC: 22 MG/DL
CALCIUM SERPL-MCNC: 9.8 MG/DL
CALCIUM SERPL-MCNC: 9.9 MG/DL
CHLORIDE SERPL-SCNC: 102 MMOL/L
CHLORIDE SERPL-SCNC: 99 MMOL/L
CO2 SERPL-SCNC: 26 MMOL/L
CO2 SERPL-SCNC: 26 MMOL/L
CREAT SERPL-MCNC: 0.88 MG/DL
CREAT SERPL-MCNC: 0.99 MG/DL
CRP SERPL-MCNC: <0.1 MG/DL
CRP SERPL-MCNC: <3 MG/L
CRP SERPL-MCNC: <3 MG/L
EOSINOPHIL # BLD AUTO: 0.09 K/UL
EOSINOPHIL # BLD AUTO: 0.11 K/UL
EOSINOPHIL NFR BLD AUTO: 1.4 %
EOSINOPHIL NFR BLD AUTO: 1.5 %
ERYTHROCYTE [SEDIMENTATION RATE] IN BLOOD BY WESTERGREN METHOD: 17 MM/HR
ERYTHROCYTE [SEDIMENTATION RATE] IN BLOOD BY WESTERGREN METHOD: 22 MM/HR
ERYTHROCYTE [SEDIMENTATION RATE] IN BLOOD BY WESTERGREN METHOD: 23 MM/HR
GLUCOSE SERPL-MCNC: 62 MG/DL
GLUCOSE SERPL-MCNC: 72 MG/DL
HCT VFR BLD CALC: 43.4 %
HCT VFR BLD CALC: 44.8 %
HGB BLD-MCNC: 13.6 G/DL
HGB BLD-MCNC: 14 G/DL
IGNF SERPL-MCNC: <4.2 PG/ML
IL10 SERPL-MCNC: <2.8 PG/ML
IL12 SERPL-MCNC: <1.9 PG/ML
IL13 SERPL-MCNC: <1.7 PG/ML
IL17A SERPL-MCNC: <1.4 PG/ML
IL2 SERPL-MCNC: 567.4 PG/ML
IL2 SERPL-MCNC: <2.1 PG/ML
IL4 SERPL-MCNC: <2.2 PG/ML
IL6 SERPL-MCNC: <2 PG/ML
IL8 SERPL-MCNC: <3 PG/ML
IMM GRANULOCYTES NFR BLD AUTO: 0.2 %
IMM GRANULOCYTES NFR BLD AUTO: 0.3 %
INTERLEUKIN 1 BETA: <6.5 PG/ML
INTERLEUKIN 5: <2.1 PG/ML
LYMPHOCYTES # BLD AUTO: 0.64 K/UL
LYMPHOCYTES # BLD AUTO: 0.84 K/UL
LYMPHOCYTES NFR BLD AUTO: 10.3 %
LYMPHOCYTES NFR BLD AUTO: 11.4 %
MAN DIFF?: NORMAL
MAN DIFF?: NORMAL
MCHC RBC-ENTMCNC: 31.1 PG
MCHC RBC-ENTMCNC: 31.3 GM/DL
MCHC RBC-ENTMCNC: 31.3 GM/DL
MCHC RBC-ENTMCNC: 31.4 PG
MCV RBC AUTO: 100.4 FL
MCV RBC AUTO: 99.3 FL
MONOCYTES # BLD AUTO: 0.44 K/UL
MONOCYTES # BLD AUTO: 0.49 K/UL
MONOCYTES NFR BLD AUTO: 5.9 %
MONOCYTES NFR BLD AUTO: 7.9 %
NEUTROPHILS # BLD AUTO: 4.96 K/UL
NEUTROPHILS # BLD AUTO: 5.94 K/UL
NEUTROPHILS NFR BLD AUTO: 79.6 %
NEUTROPHILS NFR BLD AUTO: 80.2 %
PLATELET # BLD AUTO: 316 K/UL
PLATELET # BLD AUTO: 318 K/UL
POTASSIUM SERPL-SCNC: 4.5 MMOL/L
POTASSIUM SERPL-SCNC: 4.9 MMOL/L
PROT SERPL-MCNC: 7 G/DL
PROT SERPL-MCNC: 7.2 G/DL
RBC # BLD: 4.37 M/UL
RBC # BLD: 4.46 M/UL
RBC # FLD: 12.9 %
RBC # FLD: 13.1 %
SODIUM SERPL-SCNC: 136 MMOL/L
SODIUM SERPL-SCNC: 140 MMOL/L
TSH SERPL-ACNC: 2.43 UIU/ML
WBC # FLD AUTO: 6.23 K/UL
WBC # FLD AUTO: 7.4 K/UL

## 2022-05-09 ENCOUNTER — APPOINTMENT (OUTPATIENT)
Dept: RHEUMATOLOGY | Facility: CLINIC | Age: 57
End: 2022-05-09
Payer: COMMERCIAL

## 2022-05-09 VITALS
BODY MASS INDEX: 25.39 KG/M2 | SYSTOLIC BLOOD PRESSURE: 116 MMHG | HEIGHT: 66 IN | DIASTOLIC BLOOD PRESSURE: 76 MMHG | OXYGEN SATURATION: 98 % | WEIGHT: 158 LBS | RESPIRATION RATE: 18 BRPM | HEART RATE: 64 BPM

## 2022-05-09 PROCEDURE — 36415 COLL VENOUS BLD VENIPUNCTURE: CPT

## 2022-05-09 PROCEDURE — 99214 OFFICE O/P EST MOD 30 MIN: CPT | Mod: 25

## 2022-05-10 LAB
ALBUMIN SERPL ELPH-MCNC: 4.6 G/DL
ALP BLD-CCNC: 77 U/L
ALT SERPL-CCNC: 24 U/L
ANION GAP SERPL CALC-SCNC: 12 MMOL/L
AST SERPL-CCNC: 25 U/L
BASOPHILS # BLD AUTO: 0.06 K/UL
BASOPHILS NFR BLD AUTO: 0.7 %
BILIRUB SERPL-MCNC: 0.2 MG/DL
BUN SERPL-MCNC: 22 MG/DL
CALCIUM SERPL-MCNC: 9.9 MG/DL
CHLORIDE SERPL-SCNC: 101 MMOL/L
CO2 SERPL-SCNC: 27 MMOL/L
CREAT SERPL-MCNC: 0.97 MG/DL
CRP SERPL-MCNC: <3 MG/L
EGFR: 68 ML/MIN/1.73M2
EOSINOPHIL # BLD AUTO: 0.12 K/UL
EOSINOPHIL NFR BLD AUTO: 1.5 %
ERYTHROCYTE [SEDIMENTATION RATE] IN BLOOD BY WESTERGREN METHOD: 10 MM/HR
GLUCOSE SERPL-MCNC: 72 MG/DL
HCT VFR BLD CALC: 44.1 %
HGB BLD-MCNC: 13.8 G/DL
IMM GRANULOCYTES NFR BLD AUTO: 0.2 %
LYMPHOCYTES # BLD AUTO: 0.69 K/UL
LYMPHOCYTES NFR BLD AUTO: 8.6 %
MAN DIFF?: NORMAL
MCHC RBC-ENTMCNC: 30.9 PG
MCHC RBC-ENTMCNC: 31.3 GM/DL
MCV RBC AUTO: 98.9 FL
MONOCYTES # BLD AUTO: 0.43 K/UL
MONOCYTES NFR BLD AUTO: 5.3 %
NEUTROPHILS # BLD AUTO: 6.73 K/UL
NEUTROPHILS NFR BLD AUTO: 83.7 %
PLATELET # BLD AUTO: 284 K/UL
POTASSIUM SERPL-SCNC: 4.5 MMOL/L
PROT SERPL-MCNC: 7.1 G/DL
RBC # BLD: 4.46 M/UL
RBC # FLD: 12.4 %
SODIUM SERPL-SCNC: 140 MMOL/L
WBC # FLD AUTO: 8.05 K/UL

## 2022-09-09 ENCOUNTER — APPOINTMENT (OUTPATIENT)
Dept: RHEUMATOLOGY | Facility: CLINIC | Age: 57
End: 2022-09-09

## 2022-09-09 VITALS
HEART RATE: 67 BPM | HEIGHT: 66 IN | DIASTOLIC BLOOD PRESSURE: 74 MMHG | BODY MASS INDEX: 25.07 KG/M2 | OXYGEN SATURATION: 98 % | WEIGHT: 156 LBS | SYSTOLIC BLOOD PRESSURE: 118 MMHG | RESPIRATION RATE: 18 BRPM

## 2022-09-09 PROCEDURE — 36415 COLL VENOUS BLD VENIPUNCTURE: CPT

## 2022-09-09 PROCEDURE — 99214 OFFICE O/P EST MOD 30 MIN: CPT | Mod: 25

## 2022-09-10 LAB
25(OH)D3 SERPL-MCNC: 39.4 NG/ML
ALBUMIN SERPL ELPH-MCNC: 4.4 G/DL
ALP BLD-CCNC: 83 U/L
ALT SERPL-CCNC: 21 U/L
ANION GAP SERPL CALC-SCNC: 15 MMOL/L
AST SERPL-CCNC: 24 U/L
BASOPHILS # BLD AUTO: 0.03 K/UL
BASOPHILS NFR BLD AUTO: 0.5 %
BILIRUB SERPL-MCNC: 0.2 MG/DL
BUN SERPL-MCNC: 17 MG/DL
CALCIUM SERPL-MCNC: 9.8 MG/DL
CHLORIDE SERPL-SCNC: 100 MMOL/L
CO2 SERPL-SCNC: 25 MMOL/L
CREAT SERPL-MCNC: 0.85 MG/DL
CRP SERPL-MCNC: <3 MG/L
EGFR: 79 ML/MIN/1.73M2
EOSINOPHIL # BLD AUTO: 0.09 K/UL
EOSINOPHIL NFR BLD AUTO: 1.4 %
ERYTHROCYTE [SEDIMENTATION RATE] IN BLOOD BY WESTERGREN METHOD: 7 MM/HR
GLUCOSE SERPL-MCNC: 71 MG/DL
HCT VFR BLD CALC: 42 %
HGB BLD-MCNC: 13.6 G/DL
IMM GRANULOCYTES NFR BLD AUTO: 0.2 %
LYMPHOCYTES # BLD AUTO: 0.87 K/UL
LYMPHOCYTES NFR BLD AUTO: 13.1 %
MAN DIFF?: NORMAL
MCHC RBC-ENTMCNC: 32 PG
MCHC RBC-ENTMCNC: 32.4 GM/DL
MCV RBC AUTO: 98.8 FL
MONOCYTES # BLD AUTO: 0.43 K/UL
MONOCYTES NFR BLD AUTO: 6.5 %
NEUTROPHILS # BLD AUTO: 5.2 K/UL
NEUTROPHILS NFR BLD AUTO: 78.3 %
PLATELET # BLD AUTO: 282 K/UL
POTASSIUM SERPL-SCNC: 4.7 MMOL/L
PROT SERPL-MCNC: 6.8 G/DL
RBC # BLD: 4.25 M/UL
RBC # FLD: 12.7 %
SODIUM SERPL-SCNC: 139 MMOL/L
WBC # FLD AUTO: 6.63 K/UL

## 2022-09-27 ENCOUNTER — APPOINTMENT (OUTPATIENT)
Dept: GYNECOLOGIC ONCOLOGY | Facility: CLINIC | Age: 57
End: 2022-09-27

## 2022-09-27 VITALS
OXYGEN SATURATION: 98 % | HEART RATE: 58 BPM | HEIGHT: 66 IN | DIASTOLIC BLOOD PRESSURE: 70 MMHG | SYSTOLIC BLOOD PRESSURE: 108 MMHG | BODY MASS INDEX: 24.75 KG/M2 | WEIGHT: 154 LBS

## 2022-09-27 PROCEDURE — 99212 OFFICE O/P EST SF 10 MIN: CPT

## 2022-09-27 RX ORDER — LORATADINE 10 MG
17 TABLET,DISINTEGRATING ORAL
Refills: 0 | Status: ACTIVE | COMMUNITY

## 2022-09-27 RX ORDER — CALCIUM CARBONATE 260MG(650)
100 TABLET,CHEWABLE ORAL
Refills: 0 | Status: DISCONTINUED | COMMUNITY
End: 2022-09-27

## 2022-09-27 NOTE — HISTORY OF PRESENT ILLNESS
[FreeTextEntry1] : This 56 y/o with h/o Stage 1A grade 1 endometrial adenocarcinoma, MSI intact, no myometrial invasion s/p RA TLH BSO, SLND on 11/12/19. Patient also with a hx of lung cancer since 2019 s/p left upper lobe wedge resection, apicoposterior segmentectomy, mediastinal lymph node dissection on 8/27/2019. Pt follows with [facility].  Patient had expressed major anxiety about having recurrence of her cancers and sees a psychiatrist. She is also on alprazolam. Denies VB or abdominal pain. She has mild urinary incontinence not overly bothersome to her. She is sexually active. She takes miralax for constipation. \par \par In Dec 2021, workup was done to evaluate right inguinal mass/node. CT abdomen and pelvis 12/8/21 revealed no evidence of recurrent tumor or metastatic disease. Followed with Dr. Mishra and Ultrasound in office 12/22/21 was wnl. Most likely musculoskeletal. Patient advised to return in 1 month for reevaluation. Patient had a follow up exam  with Dr. Mishra on 2/2/22 and swollen lymph node again palpated on the right. US of groin was wnl on 2/10/22. \par \par Ctscan abd/pelvis-6/29/22-ordered by SORIN-STEVE, constipation \par Chest CT-8/15/93-ATF-tlzxjky by [facility]\par \par Health maintenance:\par \par Pap smear-3/2022-wnl \par Mammo-5/26/22-BR 2-reports wnl \par Colonoscopy-June 2019-reports wnl \par [id number]\par

## 2022-09-27 NOTE — PHYSICAL EXAM
[Chaperone Present] : A chaperone was present in the examining room during all aspects of the physical examination [Absent] : Adnexa(ae): Absent [Normal] : Anus and perineum: Normal sphincter tone, no masses, no prolapse. [Fully active, able to carry on all pre-disease performance without restriction] : Status 0 - Fully active, able to carry on all pre-disease performance without restriction [FreeTextEntry1] : Mary Lou Liz MA was present as chaperone during exam today.

## 2022-09-27 NOTE — REASON FOR VISIT
[FreeTextEntry1] : [facility]\par \par [facility] [phone] at [address] [place] [id number]\par \par Hx of Stage IA grade 1 endometrial adenocarcinoma within endometrium. \par -S/p RA TLH BSO, SLND on 11/12/2019. \par -Pt also w/ hx of lung cancer s/p left upper lobe wedge resection, apicoposterior segmentectomy, medialstinal lymph node dissection on 8/27/2019.\par

## 2022-09-27 NOTE — ASSESSMENT
[FreeTextEntry1] : Pt is clinically STEVE on exam today. \par \par The signs and symptoms of recurrence were reviewed and the patient knows to return to see me sooner if she has any of these or any other concerns prior to her next visit.  Reasonable expectations for cure were also discussed.\par \par

## 2023-01-09 ENCOUNTER — APPOINTMENT (OUTPATIENT)
Dept: RHEUMATOLOGY | Facility: CLINIC | Age: 58
End: 2023-01-09
Payer: COMMERCIAL

## 2023-01-09 VITALS
RESPIRATION RATE: 18 BRPM | DIASTOLIC BLOOD PRESSURE: 88 MMHG | HEART RATE: 62 BPM | WEIGHT: 151 LBS | OXYGEN SATURATION: 98 % | SYSTOLIC BLOOD PRESSURE: 151 MMHG | BODY MASS INDEX: 24.27 KG/M2 | HEIGHT: 66 IN

## 2023-01-09 DIAGNOSIS — Z79.1 LONG TERM (CURRENT) USE OF NON-STEROIDAL ANTI-INFLAMMATORIES (NSAID): ICD-10-CM

## 2023-01-09 PROCEDURE — 99214 OFFICE O/P EST MOD 30 MIN: CPT | Mod: 25

## 2023-01-09 PROCEDURE — 36415 COLL VENOUS BLD VENIPUNCTURE: CPT

## 2023-01-10 LAB
25(OH)D3 SERPL-MCNC: 38.8 NG/ML
ALBUMIN SERPL ELPH-MCNC: 4.6 G/DL
ALP BLD-CCNC: 85 U/L
ALT SERPL-CCNC: 33 U/L
ANION GAP SERPL CALC-SCNC: 11 MMOL/L
AST SERPL-CCNC: 29 U/L
BASOPHILS # BLD AUTO: 0.05 K/UL
BASOPHILS NFR BLD AUTO: 1 %
BILIRUB SERPL-MCNC: 0.3 MG/DL
BUN SERPL-MCNC: 23 MG/DL
CALCIUM SERPL-MCNC: 9.9 MG/DL
CHLORIDE SERPL-SCNC: 102 MMOL/L
CO2 SERPL-SCNC: 29 MMOL/L
CREAT SERPL-MCNC: 0.97 MG/DL
CRP SERPL-MCNC: <3 MG/L
EGFR: 67 ML/MIN/1.73M2
EOSINOPHIL # BLD AUTO: 0.19 K/UL
EOSINOPHIL NFR BLD AUTO: 3.7 %
ERYTHROCYTE [SEDIMENTATION RATE] IN BLOOD BY WESTERGREN METHOD: 13 MM/HR
FERRITIN SERPL-MCNC: 78 NG/ML
GLUCOSE SERPL-MCNC: 66 MG/DL
HCT VFR BLD CALC: 43.1 %
HGB BLD-MCNC: 13.8 G/DL
IMM GRANULOCYTES NFR BLD AUTO: 0 %
IRON SATN MFR SERPL: 32 %
IRON SERPL-MCNC: 101 UG/DL
LYMPHOCYTES # BLD AUTO: 0.93 K/UL
LYMPHOCYTES NFR BLD AUTO: 18.1 %
MAN DIFF?: NORMAL
MCHC RBC-ENTMCNC: 32 GM/DL
MCHC RBC-ENTMCNC: 32.2 PG
MCV RBC AUTO: 100.7 FL
MONOCYTES # BLD AUTO: 0.35 K/UL
MONOCYTES NFR BLD AUTO: 6.8 %
NEUTROPHILS # BLD AUTO: 3.61 K/UL
NEUTROPHILS NFR BLD AUTO: 70.4 %
PLATELET # BLD AUTO: 317 K/UL
POTASSIUM SERPL-SCNC: 4.4 MMOL/L
PROT SERPL-MCNC: 6.8 G/DL
RBC # BLD: 4.28 M/UL
RBC # FLD: 12.4 %
SODIUM SERPL-SCNC: 142 MMOL/L
TIBC SERPL-MCNC: 315 UG/DL
UIBC SERPL-MCNC: 215 UG/DL
WBC # FLD AUTO: 5.13 K/UL

## 2023-03-22 ENCOUNTER — APPOINTMENT (OUTPATIENT)
Dept: OBGYN | Facility: CLINIC | Age: 58
End: 2023-03-22

## 2023-03-23 ENCOUNTER — APPOINTMENT (OUTPATIENT)
Dept: OBGYN | Facility: CLINIC | Age: 58
End: 2023-03-23
Payer: COMMERCIAL

## 2023-03-23 ENCOUNTER — NON-APPOINTMENT (OUTPATIENT)
Age: 58
End: 2023-03-23

## 2023-03-23 VITALS
WEIGHT: 162 LBS | BODY MASS INDEX: 26.03 KG/M2 | SYSTOLIC BLOOD PRESSURE: 132 MMHG | DIASTOLIC BLOOD PRESSURE: 78 MMHG | HEIGHT: 66 IN

## 2023-03-23 DIAGNOSIS — Z12.11 ENCOUNTER FOR SCREENING FOR MALIGNANT NEOPLASM OF COLON: ICD-10-CM

## 2023-03-23 DIAGNOSIS — Z87.42 PERSONAL HISTORY OF OTHER DISEASES OF THE FEMALE GENITAL TRACT: ICD-10-CM

## 2023-03-23 DIAGNOSIS — Z12.31 ENCOUNTER FOR SCREENING MAMMOGRAM FOR MALIGNANT NEOPLASM OF BREAST: ICD-10-CM

## 2023-03-23 DIAGNOSIS — Z01.411 ENCOUNTER FOR GYNECOLOGICAL EXAMINATION (GENERAL) (ROUTINE) WITH ABNORMAL FINDINGS: ICD-10-CM

## 2023-03-23 PROCEDURE — 99396 PREV VISIT EST AGE 40-64: CPT

## 2023-03-23 RX ORDER — DICLOFENAC SODIUM 1% 10 MG/G
1 GEL TOPICAL DAILY
Qty: 6 | Refills: 3 | Status: COMPLETED | COMMUNITY
Start: 2021-07-16 | End: 2023-03-23

## 2023-03-29 LAB — CYTOLOGY CVX/VAG DOC THIN PREP: ABNORMAL

## 2023-03-29 NOTE — DISCUSSION/SUMMARY
[FreeTextEntry1] : Pap performed today. GUAIAC negative.\par \par Prescription for mammogram screening given.\par \par Self-breast exam reviewed.\par \par She will follow up annually and as needed.

## 2023-03-29 NOTE — END OF VISIT
[FreeTextEntry3] : I, Angelo Palacios, solely acted as scribe for Dr. Phyllis Saul on 03/23/23. All medical entries made by the Scribe were at my, Dr. Saul's, direction and personally dictated by me on 03/23/23. I have reviewed the chart and agree that the record accurately reflects my personal performance of the history, physical exam, assessment and plan. I have also personally directed, reviewed, and agreed with the chart.

## 2023-03-29 NOTE — HISTORY OF PRESENT ILLNESS
[Y] : Positive pregnancy history [Menarche Age: ____] : age at menarche was [unfilled] [No] : Patient does not have concerns regarding sex [Currently Active] : currently active [Mammogramdate] : 5/26/22 [TextBox_19] : br2 [PapSmeardate] : 3/21/22 [TextBox_31] : neg [HPVDate] : 3/21/22 [TextBox_78] : neg [LMPDate] : 2016 [PGHxTotal] : 3 [[facility] HospitallTerm] : 3 [[facility] Centeriving] : 3 [FreeTextEntry1] : 2016

## 2023-03-29 NOTE — PHYSICAL EXAM
[Appropriately responsive] : appropriately responsive [Alert] : alert [No Acute Distress] : no acute distress [Soft] : soft [Non-tender] : non-tender [Non-distended] : non-distended [Oriented x3] : oriented x3 [Examination Of The Breasts] : a normal appearance [No Discharge] : no discharge [No Masses] : no breast masses were palpable [Labia Majora] : normal [Labia Minora] : normal [Normal] : normal [Uterine Adnexae] : normal [No Bleeding] : There was no active vaginal bleeding [Absent] : absent [Atrophy] : atrophy [Dry Mucosa] : dry mucosa [FreeTextEntry9] : Stool for occult blood.

## 2023-04-03 ENCOUNTER — APPOINTMENT (OUTPATIENT)
Dept: GYNECOLOGIC ONCOLOGY | Facility: CLINIC | Age: 58
End: 2023-04-03
Payer: COMMERCIAL

## 2023-04-03 VITALS
SYSTOLIC BLOOD PRESSURE: 126 MMHG | OXYGEN SATURATION: 99 % | HEIGHT: 66 IN | BODY MASS INDEX: 25.71 KG/M2 | HEART RATE: 61 BPM | WEIGHT: 160 LBS | DIASTOLIC BLOOD PRESSURE: 82 MMHG

## 2023-04-03 PROCEDURE — 99213 OFFICE O/P EST LOW 20 MIN: CPT

## 2023-04-03 NOTE — ASSESSMENT
[FreeTextEntry1] : 56 yo female with history of stage IA grade 1 endometrial cancer.\par \par Clinically STEVE x 3.5 years.

## 2023-04-03 NOTE — REASON FOR VISIT
[FreeTextEntry1] : [facility]\par [address]\[place] [address]\par [phone]\par \par [facility]- Stage IA grade 1 endometrial adenocarcinoma

## 2023-04-03 NOTE — HISTORY OF PRESENT ILLNESS
[FreeTextEntry1] : 58 yo with history of stage 1A grade 1 endometrial adenocarcinoma, MSI intact, no myometrial invasion s/p RA TLH BSO, SLND on 11/12/19. No adjuvant treatment was recommended at the time. In December 2021, workup was done to evaluate right inguinal mass/node. CT A/P on 12/8/21 revealed no evidence of recurrent tumor or metastatic disease. She followed with Dr. Mishra and had ultrasound performed in office on 12/22/21, which was WNL. Most likely musculoskeletal. Patient was advised to return in 1 month for re-evaluation. Patient had a follow up exam with Dr. Mishra on 2/2/22 and swollen lymph node was again palpated on the right. US of groin was WNL on 2/10/22. She presents to office today for routine surveillance examination. She saw Dr. Saul for her annual visit on 3/23/23. She reports doing well from a gynecologic standpoint. Denies vaginal bleeding or discharge, abdominopelvic pain, change in appetite, change in bladder or bowel habits.\par \par Of note, patient also has a history of lung cancer since 2019 and is s/p left upper lobe wedge resection, apicoposterior segmentectomy, mediastinal lymph node dissection on 8/27/2019. She continues to follow with [facility] annually.\par \par CT A/P 6/2022- STEVE, constipation\par CT Chest 8/2022- STEVE\par \par Health maintenance-\par Last vpap: 3/2023- atrophic vaginitis\par Last mammo: 5/2022- BR2\par Last colo: 6/2019- WNL\par Last DEXA: 5/2021- Osteopenia

## 2023-04-03 NOTE — PHYSICAL EXAM
[Chaperone Present] : A chaperone was present in the examining room during all aspects of the physical examination [Absent] : Adnexa(ae): Absent [Normal] : Bimanual Exam: Normal [FreeTextEntry1] : Name of chaperone: Mary Lou Liz MA

## 2023-04-05 NOTE — ED ADULT TRIAGE NOTE - BP NONINVASIVE SYSTOLIC (MM HG)
EXAM: CT Chest WITH contrast (PE Protocol)

INDICATION:      

\S\Chest pain, SOB, rule out PE, history of Lung CA

\S\[id number]

\S\1358  

COMPARISON: Chest x-ray of the same date.



TECHNIQUE:

Chest was scanned utilizing a multidetector helical scanner from the lung apex

through the level of the diaphragm after administration of IV contrast. Thin

section reconstructions were obtained with special concentration on the

pulmonary arteries. Coronal and sagittal reformations were obtained. Pulmonary

embolism protocol was performed.

     IV CONTRAST: 100 mL of Isovue-370



COMPLICATIONS: None



RADIATION DOSE: 

     Total DLP: 458.69 mGy*cm

     Estimated effective dose: (DLP x 0.014 x size factor) mSv

     CTDIvol has been reviewed. It is below the limits set by the Radiation

Protocol Committee (RPC).



FINDINGS:



LINES/ TUBES: Right chest wall port in place with tip terminating at cavoatrial

junction. Posterolateral lower thorax catheter in place with tip terminating in

the left base.



LUNGS AND AIRWAYS: No filling defect is identified within the pulmonary

arteries to the segmental level.  The left upper lobe is completely collapsed

and replaced by effusion. Infiltrative mass, encasing the right hilar vessels

with complete occlusion of the left upper lobe pulmonary artery and airway.

Significant nodular intralobular septal thickening of the small aerated left

lower lobe and possible numerous nodules. 

Upper lobe predominant emphysematous changes, seen in right lung. Pulmonary

vascular congestion. 4 mm lateral right middle lobe nodule (3/67). Few

additional subcentimeter right lung nodules.



Mild dependent atelectasis of the right lung base.



Right Airways are intact. Significant narrowing and encasement of the left main

bronchus with cancer with minimal air extending to the left lower lobe

segmental branches.



PLEURA: Trace right pleural effusion. Moderate loculated left pleural effusion.



HEART AND MEDIASTINUM: The visualized thyroid gland is normal.  No axillary

lymphadenopathy. 1 cm paratracheal (series 2, image 36) and 2 cm subcarinal

(2/46) lymph nodes. Bilateral hilar adenopathy, measuring up to 1.9 cm on the

right side (2/45). The heart is normal in size. There is a small pericardial

effusion.     .  Main pulmonary artery measures 2.4 cm in diameter.



UPPER ABDOMEN: Metallic structure in left upper abdomen which shadowing,

probably vascular coil, limits evaluation.



BONES: T7 vertebral body sclerotic focus measures 0.8 cm.



SOFT TISSUES: Unremarkable. Fat-containing midline upper abdominal ventral

hernias.



IMPRESSION: 

1.  No pulmonary emboli.

2.  Infiltrative left lung mass, encasing the left hilar vessels with complete

occlusion of the left upper lobe pulmonary artery as well as airway and

collapsed left upper lobe. The left upper lobe pleural space is  replaced by

pleural effusion.

3.  Significant nodular interlobular septal thickening of the small aerated

portion of the left lower lobe with possible numerous nodules.

4.  Encasement and significant narrowing/near complete occlusion of the left

main bronchus with minimal air within left lower lobe airways.

5.  Emphysematous changes of the lungs.

6.  Trace right pleural effusion.

7.  Mediastinal and hilar lymphadenopathy.



Signed by: Dr. Chencho Busby MD on 8/27/2018 2:59 PM
140
19-Jan-2017 03:17
Cheek Interpolation Flap Text: A decision was made to reconstruct the defect utilizing an interpolation axial flap and a staged reconstruction.  A telfa template was made of the defect.  This telfa template was then used to outline the Cheek Interpolation flap.  The donor area for the pedicle flap was then injected with anesthesia.  The flap was excised through the skin and subcutaneous tissue down to the layer of the underlying musculature.  The interpolation flap was carefully excised within this deep plane to maintain its blood supply and carried over to close the primary defect. The edges of the donor site were undermined.   The donor site was closed in a primary fashion.  The pedicle was then rotated into position and sutured.  Once the tube was sutured into place, adequate blood supply was confirmed with blanching and refill.  The pedicle was then wrapped with xeroform gauze and dressed appropriately with a telfa and gauze bandage to ensure continued blood supply and protect the attached pedicle.

## 2023-05-25 ENCOUNTER — APPOINTMENT (OUTPATIENT)
Dept: RHEUMATOLOGY | Facility: CLINIC | Age: 58
End: 2023-05-25
Payer: COMMERCIAL

## 2023-05-25 VITALS
WEIGHT: 160 LBS | HEIGHT: 66 IN | OXYGEN SATURATION: 97 % | HEART RATE: 61 BPM | SYSTOLIC BLOOD PRESSURE: 123 MMHG | BODY MASS INDEX: 25.71 KG/M2 | DIASTOLIC BLOOD PRESSURE: 80 MMHG | TEMPERATURE: 97.9 F

## 2023-05-25 DIAGNOSIS — R76.8 OTHER SPECIFIED ABNORMAL IMMUNOLOGICAL FINDINGS IN SERUM: ICD-10-CM

## 2023-05-25 DIAGNOSIS — Z91.89 OTHER SPECIFIED PERSONAL RISK FACTORS, NOT ELSEWHERE CLASSIFIED: ICD-10-CM

## 2023-05-25 PROCEDURE — 99214 OFFICE O/P EST MOD 30 MIN: CPT | Mod: 25

## 2023-05-25 PROCEDURE — 36415 COLL VENOUS BLD VENIPUNCTURE: CPT

## 2023-05-26 LAB
ALBUMIN SERPL ELPH-MCNC: 4.5 G/DL
ALP BLD-CCNC: 79 U/L
ALT SERPL-CCNC: 27 U/L
ANION GAP SERPL CALC-SCNC: 12 MMOL/L
AST SERPL-CCNC: 31 U/L
BILIRUB SERPL-MCNC: 0.3 MG/DL
BUN SERPL-MCNC: 22 MG/DL
CALCIUM SERPL-MCNC: 9.9 MG/DL
CHLORIDE SERPL-SCNC: 102 MMOL/L
CO2 SERPL-SCNC: 28 MMOL/L
CREAT SERPL-MCNC: 0.99 MG/DL
CRP SERPL-MCNC: <3 MG/L
EGFR: 65 ML/MIN/1.73M2
ERYTHROCYTE [SEDIMENTATION RATE] IN BLOOD BY WESTERGREN METHOD: 20 MM/HR
GLUCOSE SERPL-MCNC: 100 MG/DL
POTASSIUM SERPL-SCNC: 4.9 MMOL/L
PROT SERPL-MCNC: 6.8 G/DL
SODIUM SERPL-SCNC: 142 MMOL/L
TSH SERPL-ACNC: 2.08 UIU/ML

## 2023-05-28 LAB
A-TUMOR NECROSIS FACT SERPL-MCNC: 7.1 PG/ML
IGNF SERPL-MCNC: <4.2 PG/ML
IL10 SERPL-MCNC: <2.8 PG/ML
IL12 SERPL-MCNC: <1.9 PG/ML
IL13 SERPL-MCNC: <1.7 PG/ML
IL17A SERPL-MCNC: <1.4 PG/ML
IL2 SERPL-MCNC: 1031.4 PG/ML
IL2 SERPL-MCNC: <2.1 PG/ML
IL4 SERPL-MCNC: <2.2 PG/ML
IL6 SERPL-MCNC: <2 PG/ML
IL8 SERPL-MCNC: <3 PG/ML
INTERLEUKIN 1 BETA: <6.5 PG/ML
INTERLEUKIN 5: <2.1 PG/ML

## 2023-05-30 PROBLEM — Z91.89 AT RISK FOR OSTEOPOROSIS: Status: ACTIVE | Noted: 2023-01-09

## 2023-05-30 PROBLEM — R76.8 FALSE POSITIVE ANTINUCLEAR ANTIBODY (ANA) LEVEL: Status: ACTIVE | Noted: 2018-12-27

## 2023-07-06 ENCOUNTER — NON-APPOINTMENT (OUTPATIENT)
Age: 58
End: 2023-07-06

## 2023-09-25 ENCOUNTER — APPOINTMENT (OUTPATIENT)
Dept: RHEUMATOLOGY | Facility: CLINIC | Age: 58
End: 2023-09-25
Payer: COMMERCIAL

## 2023-09-25 DIAGNOSIS — M15.1 HEBERDEN'S NODES (WITH ARTHROPATHY): ICD-10-CM

## 2023-09-25 DIAGNOSIS — M17.0 BILATERAL PRIMARY OSTEOARTHRITIS OF KNEE: ICD-10-CM

## 2023-09-25 PROCEDURE — 36415 COLL VENOUS BLD VENIPUNCTURE: CPT

## 2023-09-25 PROCEDURE — 99214 OFFICE O/P EST MOD 30 MIN: CPT | Mod: 25

## 2023-09-25 RX ORDER — ESCITALOPRAM OXALATE 10 MG/1
10 TABLET ORAL
Refills: 0 | Status: DISCONTINUED | COMMUNITY
Start: 2019-05-29 | End: 2023-09-25

## 2023-09-25 RX ORDER — ADHESIVE TAPE 3"X 2.3 YD
50 MCG TAPE, NON-MEDICATED TOPICAL
Qty: 1 | Refills: 0 | Status: ACTIVE | COMMUNITY
Start: 2023-09-25

## 2023-09-25 RX ORDER — CHOLECALCIFEROL (VITAMIN D3) 25 MCG
TABLET ORAL
Refills: 0 | Status: DISCONTINUED | COMMUNITY
End: 2023-09-25

## 2023-09-25 RX ORDER — ESCITALOPRAM OXALATE 20 MG/1
20 TABLET ORAL
Refills: 0 | Status: ACTIVE | COMMUNITY
Start: 2019-05-03

## 2023-09-26 LAB
ALBUMIN SERPL ELPH-MCNC: 4.6 G/DL
ALP BLD-CCNC: 77 U/L
ALT SERPL-CCNC: 34 U/L
ANION GAP SERPL CALC-SCNC: 11 MMOL/L
AST SERPL-CCNC: 32 U/L
BASOPHILS # BLD AUTO: 0.04 K/UL
BASOPHILS NFR BLD AUTO: 0.5 %
BILIRUB SERPL-MCNC: 0.3 MG/DL
BUN SERPL-MCNC: 17 MG/DL
CALCIUM SERPL-MCNC: 9.7 MG/DL
CHLORIDE SERPL-SCNC: 99 MMOL/L
CO2 SERPL-SCNC: 26 MMOL/L
CREAT SERPL-MCNC: 0.84 MG/DL
CRP SERPL-MCNC: <3 MG/L
EGFR: 80 ML/MIN/1.73M2
EOSINOPHIL # BLD AUTO: 0.1 K/UL
EOSINOPHIL NFR BLD AUTO: 1.3 %
ERYTHROCYTE [SEDIMENTATION RATE] IN BLOOD BY WESTERGREN METHOD: 13 MM/HR
GLUCOSE SERPL-MCNC: 93 MG/DL
HCT VFR BLD CALC: 39.1 %
HGB BLD-MCNC: 13.2 G/DL
IMM GRANULOCYTES NFR BLD AUTO: 0.3 %
LYMPHOCYTES # BLD AUTO: 0.78 K/UL
LYMPHOCYTES NFR BLD AUTO: 10.3 %
MAN DIFF?: NORMAL
MCHC RBC-ENTMCNC: 32.4 PG
MCHC RBC-ENTMCNC: 33.8 GM/DL
MCV RBC AUTO: 96.1 FL
MONOCYTES # BLD AUTO: 0.42 K/UL
MONOCYTES NFR BLD AUTO: 5.5 %
NEUTROPHILS # BLD AUTO: 6.24 K/UL
NEUTROPHILS NFR BLD AUTO: 82.1 %
PLATELET # BLD AUTO: 281 K/UL
POTASSIUM SERPL-SCNC: 4.8 MMOL/L
PROT SERPL-MCNC: 6.8 G/DL
RBC # BLD: 4.07 M/UL
RBC # FLD: 12.3 %
SODIUM SERPL-SCNC: 135 MMOL/L
WBC # FLD AUTO: 7.6 K/UL

## 2023-10-03 ENCOUNTER — APPOINTMENT (OUTPATIENT)
Dept: GYNECOLOGIC ONCOLOGY | Facility: CLINIC | Age: 58
End: 2023-10-03
Payer: COMMERCIAL

## 2023-10-03 VITALS
OXYGEN SATURATION: 94 % | BODY MASS INDEX: 26.03 KG/M2 | HEIGHT: 66 IN | DIASTOLIC BLOOD PRESSURE: 78 MMHG | WEIGHT: 162 LBS | HEART RATE: 63 BPM | SYSTOLIC BLOOD PRESSURE: 122 MMHG

## 2023-10-03 PROCEDURE — 99213 OFFICE O/P EST LOW 20 MIN: CPT

## 2024-01-12 ENCOUNTER — APPOINTMENT (OUTPATIENT)
Dept: RHEUMATOLOGY | Facility: CLINIC | Age: 59
End: 2024-01-12
Payer: COMMERCIAL

## 2024-01-12 DIAGNOSIS — M16.12 UNILATERAL PRIMARY OSTEOARTHRITIS, LEFT HIP: ICD-10-CM

## 2024-01-12 DIAGNOSIS — M15.9 POLYOSTEOARTHRITIS, UNSPECIFIED: ICD-10-CM

## 2024-01-12 DIAGNOSIS — M65.9 SYNOVITIS AND TENOSYNOVITIS, UNSPECIFIED: ICD-10-CM

## 2024-01-12 DIAGNOSIS — R53.83 OTHER FATIGUE: ICD-10-CM

## 2024-01-12 DIAGNOSIS — M19.072 PRIMARY OSTEOARTHRITIS, RIGHT ANKLE AND FOOT: ICD-10-CM

## 2024-01-12 DIAGNOSIS — Z79.1 ENCOUNTER FOR THERAPEUTIC DRUG LVL MONITORING: ICD-10-CM

## 2024-01-12 DIAGNOSIS — M19.071 PRIMARY OSTEOARTHRITIS, RIGHT ANKLE AND FOOT: ICD-10-CM

## 2024-01-12 DIAGNOSIS — Z51.81 ENCOUNTER FOR THERAPEUTIC DRUG LVL MONITORING: ICD-10-CM

## 2024-01-12 PROCEDURE — 36415 COLL VENOUS BLD VENIPUNCTURE: CPT

## 2024-01-12 PROCEDURE — 99214 OFFICE O/P EST MOD 30 MIN: CPT | Mod: 25

## 2024-01-12 RX ORDER — METHOCARBAMOL 500 MG/1
500 TABLET, FILM COATED ORAL
Qty: 180 | Refills: 3 | Status: ACTIVE | COMMUNITY
Start: 2021-07-16 | End: 1900-01-01

## 2024-01-13 LAB
ALBUMIN SERPL ELPH-MCNC: 4.6 G/DL
ALP BLD-CCNC: 82 U/L
ALT SERPL-CCNC: 26 U/L
ANION GAP SERPL CALC-SCNC: 11 MMOL/L
AST SERPL-CCNC: 26 U/L
BASOPHILS # BLD AUTO: 0.04 K/UL
BASOPHILS NFR BLD AUTO: 0.6 %
BILIRUB SERPL-MCNC: 0.3 MG/DL
BUN SERPL-MCNC: 20 MG/DL
CALCIUM SERPL-MCNC: 9.6 MG/DL
CHLORIDE SERPL-SCNC: 97 MMOL/L
CO2 SERPL-SCNC: 27 MMOL/L
CREAT SERPL-MCNC: 0.9 MG/DL
CRP SERPL-MCNC: <3 MG/L
EGFR: 73 ML/MIN/1.73M2
EOSINOPHIL # BLD AUTO: 0.09 K/UL
EOSINOPHIL NFR BLD AUTO: 1.3 %
ERYTHROCYTE [SEDIMENTATION RATE] IN BLOOD BY WESTERGREN METHOD: 8 MM/HR
GLUCOSE SERPL-MCNC: 79 MG/DL
HCT VFR BLD CALC: 40.3 %
HGB BLD-MCNC: 13.1 G/DL
IMM GRANULOCYTES NFR BLD AUTO: 0.4 %
LYMPHOCYTES # BLD AUTO: 0.81 K/UL
LYMPHOCYTES NFR BLD AUTO: 11.7 %
MAN DIFF?: NORMAL
MCHC RBC-ENTMCNC: 31.7 PG
MCHC RBC-ENTMCNC: 32.5 GM/DL
MCV RBC AUTO: 97.6 FL
MONOCYTES # BLD AUTO: 0.44 K/UL
MONOCYTES NFR BLD AUTO: 6.3 %
NEUTROPHILS # BLD AUTO: 5.54 K/UL
NEUTROPHILS NFR BLD AUTO: 79.7 %
PLATELET # BLD AUTO: 306 K/UL
POTASSIUM SERPL-SCNC: 5.1 MMOL/L
PROT SERPL-MCNC: 7 G/DL
RBC # BLD: 4.13 M/UL
RBC # FLD: 12.8 %
SODIUM SERPL-SCNC: 135 MMOL/L
WBC # FLD AUTO: 6.95 K/UL

## 2024-03-28 ENCOUNTER — APPOINTMENT (OUTPATIENT)
Dept: OBGYN | Facility: CLINIC | Age: 59
End: 2024-03-28
Payer: COMMERCIAL

## 2024-03-28 VITALS
BODY MASS INDEX: 26.2 KG/M2 | DIASTOLIC BLOOD PRESSURE: 60 MMHG | WEIGHT: 163 LBS | SYSTOLIC BLOOD PRESSURE: 110 MMHG | HEIGHT: 66 IN

## 2024-03-28 DIAGNOSIS — N94.10 UNSPECIFIED DYSPAREUNIA: ICD-10-CM

## 2024-03-28 DIAGNOSIS — Z12.31 ENCOUNTER FOR SCREENING MAMMOGRAM FOR MALIGNANT NEOPLASM OF BREAST: ICD-10-CM

## 2024-03-28 DIAGNOSIS — Z01.419 ENCOUNTER FOR GYNECOLOGICAL EXAMINATION (GENERAL) (ROUTINE) W/OUT ABNORMAL FINDINGS: ICD-10-CM

## 2024-03-28 DIAGNOSIS — Z23 ENCOUNTER FOR IMMUNIZATION: ICD-10-CM

## 2024-03-28 DIAGNOSIS — N95.2 POSTMENOPAUSAL ATROPHIC VAGINITIS: ICD-10-CM

## 2024-03-28 DIAGNOSIS — N89.8 OTHER SPECIFIED NONINFLAMMATORY DISORDERS OF VAGINA: ICD-10-CM

## 2024-03-28 DIAGNOSIS — Z01.411 ENCOUNTER FOR GYNECOLOGICAL EXAMINATION (GENERAL) (ROUTINE) WITH ABNORMAL FINDINGS: ICD-10-CM

## 2024-03-28 PROCEDURE — 99396 PREV VISIT EST AGE 40-64: CPT

## 2024-03-28 PROCEDURE — 99214 OFFICE O/P EST MOD 30 MIN: CPT | Mod: 25

## 2024-03-28 RX ORDER — ESTRADIOL 10 UG/1
10 TABLET VAGINAL
Qty: 54 | Refills: 3 | Status: ACTIVE | COMMUNITY
Start: 2024-03-28 | End: 1900-01-01

## 2024-03-28 NOTE — HISTORY OF PRESENT ILLNESS
[perimenopausal] : perimenopausal [N] : Patient reports normal menses [Menarche Age: ____] : age at menarche was [unfilled] [No] : Patient does not have concerns regarding sex [Currently Active] : currently active [Patient reported colonoscopy was normal] : Patient reported colonoscopy was normal [Y] : Patient is sexually active [Men] : men [Mammogramdate] : 06/21/2023 [TextBox_19] : BR2 [PapSmeardate] : 03/23/2023 [TextBox_31] : NEGATIVE [BoneDensityDate] : 06/29/2023 [TextBox_37] : NORMAL [ColonoscopyDate] : 2019 [LMPDate] : 2016 [PGHxTotal] : 3 [[facility] CenterlTerm] : 3 [[facility] Centeriving] : 3 [FreeTextEntry1] : 2016

## 2024-03-28 NOTE — PHYSICAL EXAM
[Appropriately responsive] : appropriately responsive [Chaperone Present] : A chaperone was present in the examining room during all aspects of the physical examination [Alert] : alert [No Acute Distress] : no acute distress [Soft] : soft [Non-tender] : non-tender [Non-distended] : non-distended [Oriented x3] : oriented x3 [Examination Of The Breasts] : a normal appearance [No Masses] : no breast masses were palpable [No Discharge] : no discharge [Labia Majora] : normal [Labia Minora] : normal [No Bleeding] : There was no active vaginal bleeding [Atrophy] : atrophy [Dry Mucosa] : dry mucosa [Absent] : absent [Uterine Adnexae] : absent [Normal] : normal [FreeTextEntry9] : Stool for occult blood.

## 2024-03-28 NOTE — END OF VISIT
[FreeTextEntry3] : I, Angelo Palacios, solely acted as scribe for Dr. Phyllis Saul on 03/28/2024. All medical entries made by the Scribe were at my, Dr. Saul's, direction and personally dictated by me on 03/28/2024. I have reviewed the chart and agree that the record accurately reflects my personal performance of the history, physical exam, assessment and plan. I have also personally directed, reviewed, and agreed with the chart.

## 2024-03-28 NOTE — DISCUSSION/SUMMARY
[FreeTextEntry1] : 1. We discussed that vaginal dryness and painful sex is secondary to vaginal atrophy, which is common in menopause, this is referred to as genitourinary syndrome of menopause.  We discussed menopausal changes in the vagina and bladder secondary to the significant reduction in estrogen. I explained that unlike the other symptoms of menopause that typically resolve over time, genitourinary syndrome of menopause typically worsens if left untreated.   We discussed the use of over-the-counter vaginal lubricants for intercourse, including the use of coconut oil both internally and externally.   We also discussed the benefits and roles of local menopausal hormone therapy via vaginal estrogen. She is aware that there is a minimal to insignificant absorption into the bloodstream with vaginal estrogen. This is regarded as very safe by the FDA. We even discussed that ACOG recommends it as a treatment option even in women with a history of breast cancer.   We discussed the different formulations of vaginal estrogen therapy including estrogen cream, vaginal estrogen tablets, and a vaginal estrogen ring. Prescription was sent to the pharmacy for Yuvafem 10mcg. She is aware that for the first 2 weeks, she needs to insert the tablet vaginally nightly, and then after the first 2 weeks it is 2 times per week. She is aware that she may not have a significant improvement in symptoms for 3 months. If there is not significant improvement after 3 months, she was instructed to follow up. She is aware that she can also use both vaginal moisturizers and vaginal estrogen for added improvement. She is aware she would need to alternate the insertion of both, and I recommend against inserting both at the same time.   She is aware that if she has any vaginal bleeding after the first 3 months of initiating treatment, she should return for a uterine evaluation. She is also aware that she may still require lubrication for intercourse. Questions answered.  2. Vaginal pap done today secondary to a H/O endometrial cancer.   3. Prescription for mammogram screening given. Self-breast exam reviewed.  4. GUAIAC negative. Recommended colonoscopy screening consult.   She will follow up annually and as needed.

## 2024-03-29 LAB
C TRACH RRNA SPEC QL NAA+PROBE: NOT DETECTED
N GONORRHOEA RRNA SPEC QL NAA+PROBE: NOT DETECTED
SOURCE TP AMPLIFICATION: NORMAL

## 2024-04-01 ENCOUNTER — NON-APPOINTMENT (OUTPATIENT)
Age: 59
End: 2024-04-01

## 2024-04-01 LAB — CYTOLOGY CVX/VAG DOC THIN PREP: ABNORMAL

## 2024-04-09 ENCOUNTER — APPOINTMENT (OUTPATIENT)
Dept: GYNECOLOGIC ONCOLOGY | Facility: CLINIC | Age: 59
End: 2024-04-09
Payer: COMMERCIAL

## 2024-04-09 VITALS
DIASTOLIC BLOOD PRESSURE: 81 MMHG | OXYGEN SATURATION: 97 % | WEIGHT: 163 LBS | BODY MASS INDEX: 26.2 KG/M2 | RESPIRATION RATE: 16 BRPM | HEART RATE: 66 BPM | HEIGHT: 66 IN | SYSTOLIC BLOOD PRESSURE: 121 MMHG

## 2024-04-09 PROCEDURE — 99212 OFFICE O/P EST SF 10 MIN: CPT

## 2024-04-09 PROCEDURE — G2211 COMPLEX E/M VISIT ADD ON: CPT

## 2024-04-09 PROCEDURE — 99459 PELVIC EXAMINATION: CPT

## 2024-04-09 NOTE — PHYSICAL EXAM
[Chaperone Present] : A chaperone was present in the examining room during all aspects of the physical examination [12632] : A chaperone was present during the pelvic exam. [FreeTextEntry2] : Yasmine Phipps MA was present as chaperone during the entire physical examination. [Absent] : Adnexa(ae): Absent [Normal] : Bimanual Exam: Normal [Fully active, able to carry on all pre-disease performance without restriction] : Status 0 - Fully active, able to carry on all pre-disease performance without restriction

## 2024-04-09 NOTE — ASSESSMENT
[FreeTextEntry1] : 59yo with history of stage 1A grade 1 endometrial adenocarcinoma, MSI intact, no myometrial invasion s/p RA TLH BSO, SLND on 11/12/19. No adjuvant treatment was recommended at the time.   Pt is clinically STEVE on exam today and is doing well overall.

## 2024-04-09 NOTE — HISTORY OF PRESENT ILLNESS
[FreeTextEntry1] : 57 yo with history of stage 1A grade 1 endometrial adenocarcinoma, MSI intact, no myometrial invasion s/p RA TLH BSO, SLND on 11/12/19. No adjuvant treatment was recommended at the time. She presents to office today for routine surveillance examination. She saw Dr. Saul for her annual visit on 3/23/23. She reports doing well from a gynecologic standpoint. Denies vaginal bleeding or discharge, abdominopelvic pain, change in appetite, change in bladder or bowel habits. She was recently started on Yuvafem for dyspareunia and vaginal dryness, also uses KY jelly with minimal relief.   Of note, patient also has a history of lung cancer since 2019 and is s/p left upper lobe wedge resection, apicoposterior segmentectomy, mediastinal lymph node dissection on 8/27/2019. She continues to follow with [facility] annually.  Pt follows with rheumatology for psoriatic arthritis, + GEORGI, OA.  CT A/P 6/2022- STEVE, constipation CT Chest 8/2022- STEVE  Health maintenance- Last vpap: 3/2024- atrophic vaginitis Last mammo: 6/2023- BR2 Last colo: 6/2019- WNL Last DEXA: 6/2023- wnl

## 2024-04-09 NOTE — REASON FOR VISIT
[FreeTextEntry1] : [facility] [phone] at [address] [place] [address]

## 2024-04-09 NOTE — PLAN
[TextEntry] : RTO in 6 months (5 year SVL) or sooner with any issues Routine follow up with PCP/rheum/[facility]  Mammo due in June 2023-pt has script

## 2024-06-21 ENCOUNTER — APPOINTMENT (OUTPATIENT)
Dept: RHEUMATOLOGY | Facility: CLINIC | Age: 59
End: 2024-06-21

## 2024-06-21 DIAGNOSIS — C34.90 MALIGNANT NEOPLASM OF UNSPECIFIED PART OF UNSPECIFIED BRONCHUS OR LUNG: ICD-10-CM

## 2024-06-21 DIAGNOSIS — Z96.641 PRESENCE OF RIGHT ARTIFICIAL HIP JOINT: ICD-10-CM

## 2024-06-21 DIAGNOSIS — C54.1 MALIGNANT NEOPLASM OF ENDOMETRIUM: ICD-10-CM

## 2024-06-21 DIAGNOSIS — L40.50 ARTHROPATHIC PSORIASIS, UNSPECIFIED: ICD-10-CM

## 2024-06-21 DIAGNOSIS — G89.29 PAIN IN RIGHT SHOULDER: ICD-10-CM

## 2024-06-21 DIAGNOSIS — M25.511 PAIN IN RIGHT SHOULDER: ICD-10-CM

## 2024-06-21 DIAGNOSIS — M79.7 FIBROMYALGIA: ICD-10-CM

## 2024-06-21 PROCEDURE — 36415 COLL VENOUS BLD VENIPUNCTURE: CPT

## 2024-06-21 PROCEDURE — 99214 OFFICE O/P EST MOD 30 MIN: CPT

## 2024-06-21 RX ORDER — DICLOFENAC SODIUM 75 MG/1
75 TABLET, DELAYED RELEASE ORAL
Qty: 180 | Refills: 3 | Status: ACTIVE | COMMUNITY
Start: 2019-09-20 | End: 1900-01-01

## 2024-06-21 RX ORDER — GABAPENTIN 300 MG/1
300 CAPSULE ORAL
Qty: 90 | Refills: 3 | Status: ACTIVE | COMMUNITY
Start: 2019-09-20 | End: 1900-01-01

## 2024-06-22 LAB
ALBUMIN SERPL ELPH-MCNC: 4.7 G/DL
ALP BLD-CCNC: 85 U/L
ALT SERPL-CCNC: 27 U/L
ANION GAP SERPL CALC-SCNC: 11 MMOL/L
AST SERPL-CCNC: 28 U/L
BASOPHILS # BLD AUTO: 0.04 K/UL
BASOPHILS NFR BLD AUTO: 0.7 %
BILIRUB SERPL-MCNC: 0.3 MG/DL
BUN SERPL-MCNC: 20 MG/DL
CALCIUM SERPL-MCNC: 10 MG/DL
CHLORIDE SERPL-SCNC: 99 MMOL/L
CO2 SERPL-SCNC: 28 MMOL/L
CREAT SERPL-MCNC: 0.97 MG/DL
CRP SERPL-MCNC: <3 MG/L
EGFR: 66 ML/MIN/1.73M2
EOSINOPHIL # BLD AUTO: 0.09 K/UL
EOSINOPHIL NFR BLD AUTO: 1.6 %
ERYTHROCYTE [SEDIMENTATION RATE] IN BLOOD BY WESTERGREN METHOD: 7 MM/HR
GLUCOSE SERPL-MCNC: 95 MG/DL
HCT VFR BLD CALC: 39.8 %
HGB BLD-MCNC: 13.4 G/DL
IMM GRANULOCYTES NFR BLD AUTO: 0.2 %
LYMPHOCYTES # BLD AUTO: 1 K/UL
LYMPHOCYTES NFR BLD AUTO: 17.4 %
MAN DIFF?: NORMAL
MCHC RBC-ENTMCNC: 32.6 PG
MCHC RBC-ENTMCNC: 33.7 GM/DL
MCV RBC AUTO: 96.8 FL
MONOCYTES # BLD AUTO: 0.46 K/UL
MONOCYTES NFR BLD AUTO: 8 %
NEUTROPHILS # BLD AUTO: 4.16 K/UL
NEUTROPHILS NFR BLD AUTO: 72.1 %
PLATELET # BLD AUTO: 296 K/UL
POTASSIUM SERPL-SCNC: 4.8 MMOL/L
PROT SERPL-MCNC: 7.2 G/DL
RBC # BLD: 4.11 M/UL
RBC # FLD: 12.5 %
SODIUM SERPL-SCNC: 139 MMOL/L
WBC # FLD AUTO: 5.76 K/UL

## 2024-06-24 LAB
A-TUMOR NECROSIS FACT SERPL-MCNC: <1.7 PG/ML
IGNF SERPL-MCNC: <4.2 PG/ML
IL10 SERPL-MCNC: <2.8 PG/ML
IL12 SERPL-MCNC: <1.9 PG/ML
IL13 SERPL-MCNC: <1.7 PG/ML
IL17A SERPL-MCNC: <1.4 PG/ML
IL2 SERPL-MCNC: 316.9 PG/ML
IL2 SERPL-MCNC: <2.1 PG/ML
IL4 SERPL-MCNC: <2.2 PG/ML
IL6 SERPL-MCNC: <2 PG/ML
IL8 SERPL-MCNC: <3 PG/ML
INTERLEUKIN 1 BETA: <6.5 PG/ML
INTERLEUKIN 5: <2.1 PG/ML

## 2024-06-26 ENCOUNTER — RESULT REVIEW (OUTPATIENT)
Age: 59
End: 2024-06-26

## 2024-06-30 LAB — 14-3-3 ETA AG SER IA-MCNC: <0.2 NG/ML

## 2024-10-29 ENCOUNTER — APPOINTMENT (OUTPATIENT)
Dept: GYNECOLOGIC ONCOLOGY | Facility: CLINIC | Age: 59
End: 2024-10-29
Payer: COMMERCIAL

## 2024-10-29 DIAGNOSIS — N89.8 OTHER SPECIFIED NONINFLAMMATORY DISORDERS OF VAGINA: ICD-10-CM

## 2024-10-29 DIAGNOSIS — C54.1 MALIGNANT NEOPLASM OF ENDOMETRIUM: ICD-10-CM

## 2024-10-29 PROCEDURE — 99212 OFFICE O/P EST SF 10 MIN: CPT

## 2024-10-29 PROCEDURE — 99459 PELVIC EXAMINATION: CPT

## 2024-10-29 RX ORDER — PANTOPRAZOLE SODIUM 40 MG/1
40 TABLET, DELAYED RELEASE ORAL
Refills: 0 | Status: ACTIVE | COMMUNITY

## 2024-10-31 LAB
CANDIDA VAG CYTO: NOT DETECTED
G VAGINALIS+PREV SP MTYP VAG QL MICRO: NOT DETECTED
T VAGINALIS VAG QL WET PREP: NOT DETECTED

## 2024-12-13 ENCOUNTER — APPOINTMENT (OUTPATIENT)
Dept: RHEUMATOLOGY | Facility: CLINIC | Age: 59
End: 2024-12-13

## 2024-12-13 VITALS
BODY MASS INDEX: 27 KG/M2 | HEIGHT: 66 IN | DIASTOLIC BLOOD PRESSURE: 83 MMHG | OXYGEN SATURATION: 98 % | HEART RATE: 64 BPM | SYSTOLIC BLOOD PRESSURE: 129 MMHG | WEIGHT: 168 LBS

## 2024-12-13 DIAGNOSIS — Z96.641 PRESENCE OF RIGHT ARTIFICIAL HIP JOINT: ICD-10-CM

## 2024-12-13 DIAGNOSIS — L40.50 ARTHROPATHIC PSORIASIS, UNSPECIFIED: ICD-10-CM

## 2024-12-13 DIAGNOSIS — M15.9 POLYOSTEOARTHRITIS, UNSPECIFIED: ICD-10-CM

## 2024-12-13 DIAGNOSIS — M19.072 PRIMARY OSTEOARTHRITIS, RIGHT ANKLE AND FOOT: ICD-10-CM

## 2024-12-13 DIAGNOSIS — Z51.81 ENCOUNTER FOR THERAPEUTIC DRUG LVL MONITORING: ICD-10-CM

## 2024-12-13 DIAGNOSIS — Z79.1 ENCOUNTER FOR THERAPEUTIC DRUG LVL MONITORING: ICD-10-CM

## 2024-12-13 DIAGNOSIS — M16.12 UNILATERAL PRIMARY OSTEOARTHRITIS, LEFT HIP: ICD-10-CM

## 2024-12-13 DIAGNOSIS — M17.0 BILATERAL PRIMARY OSTEOARTHRITIS OF KNEE: ICD-10-CM

## 2024-12-13 DIAGNOSIS — M79.7 FIBROMYALGIA: ICD-10-CM

## 2024-12-13 DIAGNOSIS — M19.071 PRIMARY OSTEOARTHRITIS, RIGHT ANKLE AND FOOT: ICD-10-CM

## 2024-12-13 DIAGNOSIS — R76.8 OTHER SPECIFIED ABNORMAL IMMUNOLOGICAL FINDINGS IN SERUM: ICD-10-CM

## 2024-12-13 PROCEDURE — 99214 OFFICE O/P EST MOD 30 MIN: CPT

## 2024-12-13 PROCEDURE — 36415 COLL VENOUS BLD VENIPUNCTURE: CPT

## 2024-12-14 LAB
ALBUMIN SERPL ELPH-MCNC: 4.3 G/DL
ALP BLD-CCNC: 82 U/L
ALT SERPL-CCNC: 31 U/L
ANION GAP SERPL CALC-SCNC: 9 MMOL/L
AST SERPL-CCNC: 26 U/L
BASOPHILS # BLD AUTO: 0.06 K/UL
BASOPHILS NFR BLD AUTO: 1.3 %
BILIRUB SERPL-MCNC: 0.3 MG/DL
BUN SERPL-MCNC: 19 MG/DL
CALCIUM SERPL-MCNC: 9.6 MG/DL
CHLORIDE SERPL-SCNC: 101 MMOL/L
CO2 SERPL-SCNC: 28 MMOL/L
CREAT SERPL-MCNC: 0.91 MG/DL
CRP SERPL-MCNC: <3 MG/L
EGFR: 72 ML/MIN/1.73M2
EOSINOPHIL # BLD AUTO: 0.16 K/UL
EOSINOPHIL NFR BLD AUTO: 3.4 %
ERYTHROCYTE [SEDIMENTATION RATE] IN BLOOD BY WESTERGREN METHOD: 4 MM/HR
GLUCOSE SERPL-MCNC: 69 MG/DL
HCT VFR BLD CALC: 41.6 %
HGB BLD-MCNC: 13.2 G/DL
IMM GRANULOCYTES NFR BLD AUTO: 0.2 %
LYMPHOCYTES # BLD AUTO: 0.79 K/UL
LYMPHOCYTES NFR BLD AUTO: 16.8 %
MAN DIFF?: NORMAL
MCHC RBC-ENTMCNC: 31.4 PG
MCHC RBC-ENTMCNC: 31.7 G/DL
MCV RBC AUTO: 98.8 FL
MONOCYTES # BLD AUTO: 0.36 K/UL
MONOCYTES NFR BLD AUTO: 7.7 %
NEUTROPHILS # BLD AUTO: 3.31 K/UL
NEUTROPHILS NFR BLD AUTO: 70.6 %
PLATELET # BLD AUTO: 301 K/UL
POTASSIUM SERPL-SCNC: 4.4 MMOL/L
PROT SERPL-MCNC: 6.7 G/DL
RBC # BLD: 4.21 M/UL
RBC # FLD: 12.7 %
SODIUM SERPL-SCNC: 138 MMOL/L
WBC # FLD AUTO: 4.69 K/UL

## 2025-01-20 ENCOUNTER — NON-APPOINTMENT (OUTPATIENT)
Age: 60
End: 2025-01-20

## 2025-01-23 ENCOUNTER — NON-APPOINTMENT (OUTPATIENT)
Age: 60
End: 2025-01-23

## 2025-04-07 ENCOUNTER — RX RENEWAL (OUTPATIENT)
Age: 60
End: 2025-04-07

## 2025-04-07 RX ORDER — ESTRADIOL 10 UG/1
10 TABLET, FILM COATED VAGINAL
Qty: 24 | Refills: 1 | Status: ACTIVE | COMMUNITY
Start: 2025-04-07 | End: 1900-01-01

## 2025-04-26 ENCOUNTER — NON-APPOINTMENT (OUTPATIENT)
Age: 60
End: 2025-04-26

## 2025-04-30 ENCOUNTER — APPOINTMENT (OUTPATIENT)
Dept: OBGYN | Facility: CLINIC | Age: 60
End: 2025-04-30

## 2025-04-30 VITALS
HEIGHT: 66 IN | BODY MASS INDEX: 27.64 KG/M2 | WEIGHT: 172 LBS | SYSTOLIC BLOOD PRESSURE: 120 MMHG | DIASTOLIC BLOOD PRESSURE: 82 MMHG

## 2025-04-30 DIAGNOSIS — Z13.820 ENCOUNTER FOR SCREENING FOR OSTEOPOROSIS: ICD-10-CM

## 2025-04-30 DIAGNOSIS — Z12.31 ENCOUNTER FOR SCREENING MAMMOGRAM FOR MALIGNANT NEOPLASM OF BREAST: ICD-10-CM

## 2025-04-30 DIAGNOSIS — C54.1 MALIGNANT NEOPLASM OF ENDOMETRIUM: ICD-10-CM

## 2025-04-30 DIAGNOSIS — Z01.411 ENCOUNTER FOR GYNECOLOGICAL EXAMINATION (GENERAL) (ROUTINE) WITH ABNORMAL FINDINGS: ICD-10-CM

## 2025-04-30 DIAGNOSIS — Z01.419 ENCOUNTER FOR GYNECOLOGICAL EXAMINATION (GENERAL) (ROUTINE) W/OUT ABNORMAL FINDINGS: ICD-10-CM

## 2025-04-30 PROCEDURE — 99396 PREV VISIT EST AGE 40-64: CPT

## 2025-04-30 PROCEDURE — 99459 PELVIC EXAMINATION: CPT

## 2025-04-30 RX ORDER — ESTRADIOL 10 UG/1
10 TABLET VAGINAL
Qty: 24 | Refills: 3 | Status: ACTIVE | COMMUNITY
Start: 2025-04-30 | End: 1900-01-01

## 2025-05-05 PROBLEM — Z01.411 ENCOUNTER FOR WELL WOMAN EXAM WITH ABNORMAL FINDINGS: Status: RESOLVED | Noted: 2024-03-28 | Resolved: 2025-05-05

## 2025-05-05 LAB — CYTOLOGY CVX/VAG DOC THIN PREP: NORMAL

## 2025-06-13 ENCOUNTER — APPOINTMENT (OUTPATIENT)
Dept: RHEUMATOLOGY | Facility: CLINIC | Age: 60
End: 2025-06-13

## 2025-06-13 VITALS
TEMPERATURE: 96.6 F | HEART RATE: 66 BPM | HEIGHT: 66 IN | SYSTOLIC BLOOD PRESSURE: 144 MMHG | WEIGHT: 172 LBS | DIASTOLIC BLOOD PRESSURE: 78 MMHG | BODY MASS INDEX: 27.64 KG/M2 | OXYGEN SATURATION: 97 %

## 2025-06-13 VITALS — OXYGEN SATURATION: 98 % | SYSTOLIC BLOOD PRESSURE: 177 MMHG | HEART RATE: 71 BPM | DIASTOLIC BLOOD PRESSURE: 78 MMHG

## 2025-06-13 PROCEDURE — G2211 COMPLEX E/M VISIT ADD ON: CPT

## 2025-06-13 PROCEDURE — 99214 OFFICE O/P EST MOD 30 MIN: CPT

## 2025-06-27 ENCOUNTER — RESULT REVIEW (OUTPATIENT)
Age: 60
End: 2025-06-27

## 2025-07-07 ENCOUNTER — NON-APPOINTMENT (OUTPATIENT)
Age: 60
End: 2025-07-07